# Patient Record
Sex: FEMALE | Race: WHITE | HISPANIC OR LATINO | Employment: FULL TIME | URBAN - METROPOLITAN AREA
[De-identification: names, ages, dates, MRNs, and addresses within clinical notes are randomized per-mention and may not be internally consistent; named-entity substitution may affect disease eponyms.]

---

## 2019-05-09 ENCOUNTER — OFFICE VISIT (OUTPATIENT)
Dept: URGENT CARE | Facility: MEDICAL CENTER | Age: 39
End: 2019-05-09
Payer: COMMERCIAL

## 2019-05-09 VITALS
DIASTOLIC BLOOD PRESSURE: 80 MMHG | OXYGEN SATURATION: 100 % | HEART RATE: 86 BPM | HEIGHT: 68 IN | SYSTOLIC BLOOD PRESSURE: 133 MMHG | TEMPERATURE: 98.4 F | RESPIRATION RATE: 18 BRPM | WEIGHT: 244.38 LBS | BODY MASS INDEX: 37.04 KG/M2

## 2019-05-09 DIAGNOSIS — J02.9 SORE THROAT: Primary | ICD-10-CM

## 2019-05-09 DIAGNOSIS — J06.9 UPPER RESPIRATORY TRACT INFECTION, UNSPECIFIED TYPE: ICD-10-CM

## 2019-05-09 LAB — S PYO AG THROAT QL: NEGATIVE

## 2019-05-09 PROCEDURE — 87430 STREP A AG IA: CPT | Performed by: PHYSICIAN ASSISTANT

## 2019-05-09 PROCEDURE — 99213 OFFICE O/P EST LOW 20 MIN: CPT | Performed by: PHYSICIAN ASSISTANT

## 2019-11-23 ENCOUNTER — APPOINTMENT (EMERGENCY)
Dept: CT IMAGING | Facility: HOSPITAL | Age: 39
End: 2019-11-23
Payer: COMMERCIAL

## 2019-11-23 ENCOUNTER — HOSPITAL ENCOUNTER (EMERGENCY)
Facility: HOSPITAL | Age: 39
Discharge: HOME | End: 2019-11-23
Attending: EMERGENCY MEDICINE
Payer: COMMERCIAL

## 2019-11-23 ENCOUNTER — APPOINTMENT (EMERGENCY)
Dept: RADIOLOGY | Facility: HOSPITAL | Age: 39
End: 2019-11-23
Payer: COMMERCIAL

## 2019-11-23 VITALS
TEMPERATURE: 98 F | HEART RATE: 109 BPM | SYSTOLIC BLOOD PRESSURE: 158 MMHG | RESPIRATION RATE: 16 BRPM | DIASTOLIC BLOOD PRESSURE: 72 MMHG | BODY MASS INDEX: 38.24 KG/M2 | HEIGHT: 67 IN | OXYGEN SATURATION: 99 % | WEIGHT: 243.61 LBS

## 2019-11-23 DIAGNOSIS — S09.90XA INJURY OF HEAD, INITIAL ENCOUNTER: ICD-10-CM

## 2019-11-23 DIAGNOSIS — S00.81XA ABRASION OF FACE, INITIAL ENCOUNTER: ICD-10-CM

## 2019-11-23 DIAGNOSIS — S20.219A CHEST WALL CONTUSION: ICD-10-CM

## 2019-11-23 DIAGNOSIS — S93.401A RIGHT ANKLE SPRAIN: Primary | ICD-10-CM

## 2019-11-23 PROCEDURE — 73610 X-RAY EXAM OF ANKLE: CPT

## 2019-11-23 PROCEDURE — 70450 CT HEAD/BRAIN W/O DYE: CPT

## 2019-11-23 PROCEDURE — 99284 EMERGENCY DEPT VISIT MOD MDM: CPT

## 2019-11-23 PROCEDURE — 99284 EMERGENCY DEPT VISIT MOD MDM: CPT | Performed by: EMERGENCY MEDICINE

## 2019-11-23 PROCEDURE — 71046 X-RAY EXAM CHEST 2 VIEWS: CPT
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
## 2019-11-23 NOTE — DISCHARGE INSTRUCTIONS
Wash your face in the shower  Right ankle, use the Aircast as needed  Use crutches as needed  Tylenol or Motrin for pain  Follow up with your doctor or return if worse

## 2020-06-04 ENCOUNTER — OFFICE VISIT (OUTPATIENT)
Dept: URGENT CARE | Facility: MEDICAL CENTER | Age: 40
End: 2020-06-04
Payer: COMMERCIAL

## 2020-06-04 VITALS
HEIGHT: 67 IN | BODY MASS INDEX: 41.06 KG/M2 | TEMPERATURE: 97.5 F | SYSTOLIC BLOOD PRESSURE: 116 MMHG | WEIGHT: 261.6 LBS | DIASTOLIC BLOOD PRESSURE: 66 MMHG | HEART RATE: 63 BPM | RESPIRATION RATE: 18 BRPM | OXYGEN SATURATION: 100 %

## 2020-06-04 DIAGNOSIS — L02.91 ABSCESS: Primary | ICD-10-CM

## 2020-06-04 PROCEDURE — G0382 LEV 3 HOSP TYPE B ED VISIT: HCPCS | Performed by: PHYSICIAN ASSISTANT

## 2020-06-04 PROCEDURE — 10060 I&D ABSCESS SIMPLE/SINGLE: CPT | Performed by: PHYSICIAN ASSISTANT

## 2020-06-04 RX ORDER — CETIRIZINE HYDROCHLORIDE 10 MG/1
TABLET ORAL
COMMUNITY

## 2020-06-04 RX ORDER — VALACYCLOVIR HYDROCHLORIDE 500 MG/1
TABLET, FILM COATED ORAL
COMMUNITY
Start: 2020-04-13

## 2020-06-04 RX ORDER — DOXYCYCLINE 100 MG/1
100 TABLET ORAL 2 TIMES DAILY
Qty: 20 TABLET | Refills: 0 | Status: SHIPPED | OUTPATIENT
Start: 2020-06-04 | End: 2020-06-14

## 2020-06-04 RX ORDER — LORATADINE 10 MG/1
10 TABLET ORAL
COMMUNITY

## 2020-06-04 RX ORDER — ALBUTEROL SULFATE 90 UG/1
1 AEROSOL, METERED RESPIRATORY (INHALATION) EVERY 6 HOURS PRN
COMMUNITY

## 2021-04-27 ENCOUNTER — IMMUNIZATIONS (OUTPATIENT)
Dept: FAMILY MEDICINE CLINIC | Facility: HOSPITAL | Age: 41
End: 2021-04-27

## 2021-04-27 DIAGNOSIS — Z23 ENCOUNTER FOR IMMUNIZATION: Primary | ICD-10-CM

## 2021-04-27 PROCEDURE — 91300 SARS-COV-2 / COVID-19 MRNA VACCINE (PFIZER-BIONTECH) 30 MCG: CPT

## 2021-04-27 PROCEDURE — 0001A SARS-COV-2 / COVID-19 MRNA VACCINE (PFIZER-BIONTECH) 30 MCG: CPT

## 2021-05-19 ENCOUNTER — IMMUNIZATIONS (OUTPATIENT)
Dept: FAMILY MEDICINE CLINIC | Facility: HOSPITAL | Age: 41
End: 2021-05-19

## 2021-05-19 DIAGNOSIS — Z23 ENCOUNTER FOR IMMUNIZATION: Primary | ICD-10-CM

## 2021-05-19 PROCEDURE — 91300 SARS-COV-2 / COVID-19 MRNA VACCINE (PFIZER-BIONTECH) 30 MCG: CPT

## 2021-05-19 PROCEDURE — 0002A SARS-COV-2 / COVID-19 MRNA VACCINE (PFIZER-BIONTECH) 30 MCG: CPT

## 2021-05-26 ENCOUNTER — ANNUAL EXAM (OUTPATIENT)
Dept: OBGYN CLINIC | Facility: CLINIC | Age: 41
End: 2021-05-26
Payer: COMMERCIAL

## 2021-05-26 VITALS
HEIGHT: 67 IN | BODY MASS INDEX: 44.61 KG/M2 | SYSTOLIC BLOOD PRESSURE: 118 MMHG | DIASTOLIC BLOOD PRESSURE: 72 MMHG | WEIGHT: 284.2 LBS

## 2021-05-26 DIAGNOSIS — Z11.51 SCREENING FOR HPV (HUMAN PAPILLOMAVIRUS): ICD-10-CM

## 2021-05-26 DIAGNOSIS — Z12.31 SCREENING MAMMOGRAM FOR HIGH-RISK PATIENT: ICD-10-CM

## 2021-05-26 DIAGNOSIS — N97.9 FEMALE INFERTILITY: ICD-10-CM

## 2021-05-26 DIAGNOSIS — Z01.419 WOMEN'S ANNUAL ROUTINE GYNECOLOGICAL EXAMINATION: Primary | ICD-10-CM

## 2021-05-26 DIAGNOSIS — Z12.4 SCREENING FOR MALIGNANT NEOPLASM OF THE CERVIX: ICD-10-CM

## 2021-05-26 PROCEDURE — 3008F BODY MASS INDEX DOCD: CPT | Performed by: OBSTETRICS & GYNECOLOGY

## 2021-05-26 PROCEDURE — 87624 HPV HI-RISK TYP POOLED RSLT: CPT | Performed by: OBSTETRICS & GYNECOLOGY

## 2021-05-26 PROCEDURE — 99386 PREV VISIT NEW AGE 40-64: CPT | Performed by: OBSTETRICS & GYNECOLOGY

## 2021-05-26 PROCEDURE — G0145 SCR C/V CYTO,THINLAYER,RESCR: HCPCS | Performed by: OBSTETRICS & GYNECOLOGY

## 2021-05-26 NOTE — PROGRESS NOTES
Subjective      Macio Baker is a 37 y o  female who presents for annual well woman exam  Periods are regular every 28-30 days, lasting 5 days  No intermenstrual bleeding, spotting, or discharge  Current contraception: none  History of abnormal Pap smear: no  Family history of uterine or ovarian cancer: no  Regular self breast exam: yes    Menstrual History:  OB History        0    Para   0    Term   0       0    AB   0    Living   0       SAB   0    TAB   0    Ectopic   0    Multiple   0    Live Births   0                  Patient's last menstrual period was 2021  The following portions of the patient's history were reviewed and updated as appropriate: allergies, current medications, past family history, past medical history, past social history, past surgical history and problem list     Review of Systems  Review of Systems   Constitutional: Negative for activity change, appetite change, chills, fatigue and fever  Respiratory: Negative for cough and shortness of breath  Cardiovascular: Negative for chest pain, palpitations and leg swelling  Gastrointestinal: Negative for abdominal pain, constipation, diarrhea, nausea and vomiting  Genitourinary: Negative for difficulty urinating, dysuria, flank pain, frequency, hematuria, urgency and vaginal discharge  Neurological: Negative for dizziness and headaches  Psychiatric/Behavioral: Negative for confusion            Objective      /72 (BP Location: Left arm, Patient Position: Sitting, Cuff Size: Standard)   Ht 5' 7" (1 702 m)   Wt 129 kg (284 lb 3 2 oz)   LMP 2021   BMI 44 51 kg/m²     Physical Exam  OBGyn Exam     General:   alert and oriented, in no acute distress, alert, appears stated age and cooperative   Heart: regular rate and rhythm, S1, S2 normal, no murmur, click, rub or gallop   Lungs: clear to auscultation bilaterally   Abdomen: soft, non-tender, without masses or organomegaly   Vulva: normal Vagina: normal mucosa, normal discharge   Cervix: no cervical motion tenderness, no lesions and nulliparous appearance   Uterus: normal size   Adnexa:  Breast Exam:  normal adnexa  breasts appear normal, no suspicious masses, no skin or nipple changes or axillary nodes  Assessment      @well woman@   64-year-old female  Annual exam   History of gastric sleeve   Genital herpes   Desire to be checked for infertility and ovarian reserve  Plan    Pap/HPV  Diet/ exercise  Calcium/ vitamin-D  Mammogram  [facility]   Pelvic ultrasound   [facility] referral for considering ovarian reserve  Return to office for annual exam   All questions answered  There are no Patient Instructions on file for this visit

## 2021-05-27 LAB
HPV HR 12 DNA CVX QL NAA+PROBE: POSITIVE
HPV16 DNA CVX QL NAA+PROBE: NEGATIVE
HPV18 DNA CVX QL NAA+PROBE: NEGATIVE

## 2021-05-28 ENCOUNTER — HOSPITAL ENCOUNTER (OUTPATIENT)
Dept: MAMMOGRAPHY | Facility: CLINIC | Age: 41
Discharge: HOME | End: 2021-05-28
Payer: COMMERCIAL

## 2021-05-28 ENCOUNTER — APPOINTMENT (OUTPATIENT)
Dept: LAB | Facility: HOSPITAL | Age: 41
End: 2021-05-28
Payer: COMMERCIAL

## 2021-05-28 VITALS — BODY MASS INDEX: 44.57 KG/M2 | HEIGHT: 67 IN | WEIGHT: 284 LBS

## 2021-05-28 DIAGNOSIS — Z12.31 SCREENING MAMMOGRAM FOR HIGH-RISK PATIENT: ICD-10-CM

## 2021-05-28 DIAGNOSIS — N97.9 FEMALE INFERTILITY: Primary | ICD-10-CM

## 2021-05-28 PROCEDURE — 77063 BREAST TOMOSYNTHESIS BI: CPT

## 2021-05-28 PROCEDURE — 83516 IMMUNOASSAY NONANTIBODY: CPT

## 2021-05-28 PROCEDURE — 77067 SCR MAMMO BI INCL CAD: CPT

## 2021-06-01 LAB
LAB AP GYN PRIMARY INTERPRETATION: NORMAL
LAB AP LMP: NORMAL
Lab: NORMAL

## 2021-06-04 LAB — MIS SERPL-MCNC: 2.33 NG/ML

## 2021-06-06 ENCOUNTER — HOSPITAL ENCOUNTER (OUTPATIENT)
Dept: ULTRASOUND IMAGING | Facility: HOSPITAL | Age: 41
Discharge: HOME | End: 2021-06-06
Payer: COMMERCIAL

## 2021-06-06 DIAGNOSIS — N97.9 FEMALE INFERTILITY: ICD-10-CM

## 2021-06-06 PROCEDURE — 76830 TRANSVAGINAL US NON-OB: CPT

## 2021-06-06 PROCEDURE — 76856 US EXAM PELVIC COMPLETE: CPT

## 2021-06-23 DIAGNOSIS — N84.1 POLYP AT CERVICAL OS: Primary | ICD-10-CM

## 2021-09-10 ENCOUNTER — OFFICE VISIT (OUTPATIENT)
Dept: URGENT CARE | Facility: MEDICAL CENTER | Age: 41
End: 2021-09-10
Payer: COMMERCIAL

## 2021-09-10 DIAGNOSIS — J02.9 ACUTE VIRAL PHARYNGITIS: Primary | ICD-10-CM

## 2021-09-10 LAB — S PYO AG THROAT QL: NEGATIVE

## 2021-09-10 PROCEDURE — G0382 LEV 3 HOSP TYPE B ED VISIT: HCPCS | Performed by: PHYSICIAN ASSISTANT

## 2021-09-10 PROCEDURE — 87147 CULTURE TYPE IMMUNOLOGIC: CPT | Performed by: PHYSICIAN ASSISTANT

## 2021-09-10 PROCEDURE — U0005 INFEC AGEN DETEC AMPLI PROBE: HCPCS | Performed by: PHYSICIAN ASSISTANT

## 2021-09-10 PROCEDURE — 87070 CULTURE OTHR SPECIMN AEROBIC: CPT | Performed by: PHYSICIAN ASSISTANT

## 2021-09-10 PROCEDURE — U0003 INFECTIOUS AGENT DETECTION BY NUCLEIC ACID (DNA OR RNA); SEVERE ACUTE RESPIRATORY SYNDROME CORONAVIRUS 2 (SARS-COV-2) (CORONAVIRUS DISEASE [COVID-19]), AMPLIFIED PROBE TECHNIQUE, MAKING USE OF HIGH THROUGHPUT TECHNOLOGIES AS DESCRIBED BY CMS-2020-01-R: HCPCS | Performed by: PHYSICIAN ASSISTANT

## 2021-09-10 NOTE — PATIENT INSTRUCTIONS
Pharyngitis  Salt water gargles  Follow up with PCP in 3-5 days  Proceed to  ER if symptoms worsen  [address]    Your healthcare provider and/or public health staff have evaluated you and have determined that you do not need to remain in the hospital at this time  At this time you can be isolated at home where you will be monitored by staff from your local or state health department  You should carefully follow the prevention and isolation steps below until a healthcare provider or local or state health department says that you can return to your normal activities  Stay home except to get medical care    People who are mildly ill with COVID-19 are able to isolate at home during their illness  You should restrict activities outside your home, except for getting medical care  Do not go to work, school, or public areas  Avoid using public transportation, ride-sharing, or taxis  Separate yourself from other people and animals in your home    People: As much as possible, you should stay in a specific room and away from other people in your home  Also, you should use a separate bathroom, if available  Animals: You should restrict contact with pets and other animals while you are sick with COVID-19, just like you would around other people  Although there have not been reports of pets or other animals becoming sick with COVID-19, it is still recommended that people sick with COVID-19 limit contact with animals until more information is known about the virus  When possible, have another member of your household care for your animals while you are sick  If you are sick with COVID-19, avoid contact with your pet, including petting, snuggling, being kissed or licked, and sharing food  If you must care for your pet or be around animals while you are sick, wash your hands before and after you interact with pets and wear a facemask  See COVID-19 and Animals for more information      Call ahead before visiting your doctor    If you have a medical appointment, call the healthcare provider and tell them that you have or may have COVID-19  This will help the healthcare providers office take steps to keep other people from getting infected or exposed  Wear a facemask    You should wear a facemask when you are around other people (e g , sharing a room or vehicle) or pets and before you enter a healthcare providers office  If you are not able to wear a facemask (for example, because it causes trouble breathing), then people who live with you should not stay in the same room with you, or they should wear a facemask if they enter your room  Cover your coughs and sneezes    Cover your mouth and nose with a tissue when you cough or sneeze  Throw used tissues in a lined trash can  Immediately wash your hands with soap and water for at least 20 seconds or, if soap and water are not available, clean your hands with an alcohol-based hand  that contains at least 60% alcohol  Clean your hands often    Wash your hands often with soap and water for at least 20 seconds, especially after blowing your nose, coughing, or sneezing; going to the bathroom; and before eating or preparing food  If soap and water are not readily available, use an alcohol-based hand  with at least 60% alcohol, covering all surfaces of your hands and rubbing them together until they feel dry  Soap and water are the best option if hands are visibly dirty  Avoid touching your eyes, nose, and mouth with unwashed hands  Avoid sharing personal household items    You should not share dishes, drinking glasses, cups, eating utensils, towels, or bedding with other people or pets in your home  After using these items, they should be washed thoroughly with soap and water      Clean all high-touch surfaces everyday    High touch surfaces include counters, tabletops, doorknobs, bathroom fixtures, toilets, phones, keyboards, tablets, and bedside tables  Also, clean any surfaces that may have blood, stool, or body fluids on them  Use a household cleaning spray or wipe, according to the label instructions  Labels contain instructions for safe and effective use of the cleaning product including precautions you should take when applying the product, such as wearing gloves and making sure you have good ventilation during use of the product  Monitor your symptoms    Seek prompt medical attention if your illness is worsening (e g , difficulty breathing)  Before seeking care, call your healthcare provider and tell them that you have, or are being evaluated for, COVID-19  Put on a facemask before you enter the facility  These steps will help the healthcare providers office to keep other people in the office or waiting room from getting infected or exposed  Ask your healthcare provider to call the local or [facility] health department  Persons who are placed under active monitoring or facilitated self-monitoring should follow instructions provided by their local health department or occupational health professionals, as appropriate  If you have a medical emergency and need to call 911, notify the dispatch personnel that you have, or are being evaluated for COVID-19  If possible, put on a facemask before emergency medical services arrive      Discontinuing home isolation    Patients with confirmed COVID-19 should remain under home isolation precautions until the following conditions are met:   - They have had no fever for at least 24 hours (that is one full day of no fever without the use medicine that reduces fevers)  AND  - other symptoms have improved (for example, when their cough or shortness of breath have improved)  AND  - If had mild or moderate illness, at least 10 days have passed since their symptoms first appeared or if severe illness (needed oxygen) or immunosuppressed, at least 20 days have passed since symptoms first appeared  Patients with confirmed COVID-19 should also notify close contacts (including their workplace) and ask that they self-quarantine  Currently, close contact is defined as being within 6 feet for 15 minutes or more from the period 24 hours starting 48 hours before symptom onset to the time at which the patient went into isolation  Close contacts of patients diagnosed with COVID-19 should be instructed by the patient to self-quarantine for 14 days from the last time of their last contact with the patient  Source: [employer]  Pharyngitis   WHAT YOU NEED TO KNOW:   Pharyngitis, or sore throat, is inflammation of the tissues and structures in your pharynx (throat)  Pharyngitis is most often caused by bacteria  It may also be caused by a cold or flu virus  Other causes include smoking, allergies, or acid reflux  DISCHARGE INSTRUCTIONS:   Call 911 for any of the following:   · You have trouble breathing or swallowing because your throat is swollen or sore  Return to the emergency department if:   · You are drooling because it hurts too much to swallow  · Your fever is higher than 102? F (39?C) or lasts longer than 3 days  · You are confused  · You taste blood in your throat  Contact your healthcare provider if:   · Your throat pain gets worse  · You have a painful lump in your throat that does not go away after 5 days  · Your symptoms do not improve after 5 days  · You have questions or concerns about your condition or care  Medicines:  Viral pharyngitis will go away on its own without treatment  Your sore throat should start to feel better in 3 to 5 days for both viral and bacterial infections  You may need any of the following:  · Antibiotics  treat a bacterial infection  · NSAIDs , such as ibuprofen, help decrease swelling, pain, and fever  NSAIDs can cause stomach bleeding or kidney problems in certain people   If you take blood thinner medicine, always ask your healthcare provider if NSAIDs are safe for you  Always read the medicine label and follow directions  · Acetaminophen  decreases pain and fever  It is available without a doctor's order  Ask how much to take and how often to take it  Follow directions  Acetaminophen can cause liver damage if not taken correctly  · Take your medicine as directed  Contact your healthcare provider if you think your medicine is not helping or if you have side effects  Tell him or her if you are allergic to any medicine  Keep a list of the medicines, vitamins, and herbs you take  Include the amounts, and when and why you take them  Bring the list or the pill bottles to follow-up visits  Carry your medicine list with you in case of an emergency  Manage your symptoms:   · Gargle salt water  Mix ¼ teaspoon salt in an 8 ounce glass of warm water and gargle  This may help decrease swelling in your throat  · Drink liquids as directed  You may need to drink more liquids than usual  Liquids may help soothe your throat and prevent dehydration  Ask how much liquid to drink each day and which liquids are best for you  · Use a cool-steam humidifier  to help moisten the air in your room and calm your cough  · Soothe your throat  with cough drops, ice, soft foods, or popsicles  Prevent the spread of pharyngitis:  Cover your mouth and nose when you cough or sneeze  Do not share food or drinks  Wash your hands often  Use soap and water  If soap and water are unavailable, use an alcohol based hand   Follow up with your healthcare provider as directed:  Write down your questions so you remember to ask them during your visits  © Copyright [employer] 2021 Information is for End User's use only and may not be sold, redistributed or otherwise used for commercial purposes   All illustrations and images included in CareNotes® are the copyrighted property of [employer]  or [facility] [facility]  The above information is an  only  It is not intended as medical advice for individual conditions or treatments  Talk to your doctor, nurse or pharmacist before following any medical regimen to see if it is safe and effective for you

## 2021-09-10 NOTE — PROGRESS NOTES
[address]        NAME: Kristin Rodriguez is a 37 y o  female  : 1980    MRN: [id number]  DATE: September 10, 2021  TIME: 11:10 AM    Assessment and Plan   Acute viral pharyngitis [J02 9]  1  Acute viral pharyngitis           Patient Instructions     Pharyngitis  Salt water gargles  Follow up with PCP in 3-5 days  Proceed to  ER if symptoms worsen  Chief Complaint     Chief Complaint   Patient presents with    Sore Throat     Started last week but last night was worse and started with nasal congestion  Has been taking cold/flu medication  Is covid vaccinated May was second shot  History of Present Illness       36 y/o female presents c/o sore throat, pain on swallowing and congestion x 3 days  Denies fever, chills, SOB      Review of Systems   Review of Systems   Constitutional: Negative for activity change, appetite change, chills, diaphoresis, fatigue and fever  HENT: Positive for congestion, rhinorrhea and sore throat  Negative for ear discharge, ear pain, facial swelling, hearing loss, mouth sores, nosebleeds, postnasal drip, sinus pressure, sinus pain, sneezing and voice change  Respiratory: Positive for cough  Negative for apnea, choking, chest tightness, shortness of breath, wheezing and stridor  Cardiovascular: Negative            Current Medications       Current Outpatient Medications:     cetirizine (ZyrTEC) 10 mg tablet, Take by mouth, Disp: , Rfl:     diclofenac sodium (VOLTAREN) 1 %, , Disp: , Rfl:     albuterol (PROVENTIL HFA,VENTOLIN HFA) 90 mcg/act inhaler, Inhale 1 puff every 6 (six) hours as needed (Patient not taking: Reported on 9/10/2021), Disp: , Rfl:     loratadine (CLARITIN) 10 mg tablet, Take 10 mg by mouth (Patient not taking: Reported on 9/10/2021), Disp: , Rfl:     valACYclovir (VALTREX) 500 mg tablet, , Disp: , Rfl:     Current Allergies     Allergies as of 09/10/2021 - Reviewed 09/10/2021   Allergen Reaction Noted    Other Itching 10/09/2015  Penicillins  05/09/2019            The following portions of the patient's history were reviewed and updated as appropriate: allergies, current medications, past family history, past medical history, past social history, past surgical history and problem list      Past Medical History:   Diagnosis Date    Allergic        Past Surgical History:   Procedure Laterality Date    BACK SURGERY      SLEEVE GASTROPLASTY      TONSILLECTOMY         Family History   Problem Relation Age of Onset    Hypertension Mother     Hypertension Father     Leukemia Father     No Known Problems Maternal Grandmother     No Known Problems Maternal Grandfather     No Known Problems Paternal Grandmother     Prostate cancer Paternal Grandfather     No Known Problems Brother     No Known Problems Maternal Aunt     No Known Problems Paternal Aunt     No Known Problems Paternal Aunt          Medications have been verified  Objective   There were no vitals taken for this visit  Physical Exam     Physical Exam  Constitutional:       General: She is not in acute distress  Appearance: She is well-developed  She is not diaphoretic  HENT:      Head: Normocephalic and atraumatic  Jaw: No trismus  Right Ear: Hearing, tympanic membrane, ear canal and external ear normal       Left Ear: Hearing, tympanic membrane, ear canal and external ear normal       Mouth/Throat:      Pharynx: Uvula midline  Posterior oropharyngeal erythema present  No oropharyngeal exudate or uvula swelling  Tonsils: No tonsillar abscesses  Cardiovascular:      Rate and Rhythm: Normal rate and regular rhythm  Heart sounds: Normal heart sounds  Pulmonary:      Effort: Pulmonary effort is normal       Breath sounds: Normal breath sounds  Musculoskeletal:      Cervical back: Normal range of motion and neck supple  Lymphadenopathy:      Cervical: Cervical adenopathy present

## 2021-09-11 LAB
BACTERIA THROAT CULT: ABNORMAL
SARS-COV-2 RNA RESP QL NAA+PROBE: NEGATIVE

## 2021-12-16 ENCOUNTER — NURSE TRIAGE (OUTPATIENT)
Dept: OTHER | Facility: OTHER | Age: 41
End: 2021-12-16

## 2021-12-16 DIAGNOSIS — Z20.828 SARS-ASSOCIATED CORONAVIRUS EXPOSURE: Primary | ICD-10-CM

## 2021-12-17 PROCEDURE — 87636 SARSCOV2 & INF A&B AMP PRB: CPT | Performed by: FAMILY MEDICINE

## 2022-06-08 ENCOUNTER — ANNUAL EXAM (OUTPATIENT)
Dept: OBGYN CLINIC | Facility: CLINIC | Age: 42
End: 2022-06-08
Payer: COMMERCIAL

## 2022-06-08 VITALS
DIASTOLIC BLOOD PRESSURE: 78 MMHG | BODY MASS INDEX: 41.68 KG/M2 | SYSTOLIC BLOOD PRESSURE: 112 MMHG | WEIGHT: 275 LBS | HEIGHT: 68 IN

## 2022-06-08 DIAGNOSIS — Z11.51 SCREENING FOR HUMAN PAPILLOMAVIRUS: ICD-10-CM

## 2022-06-08 DIAGNOSIS — Z12.31 SCREENING MAMMOGRAM, ENCOUNTER FOR: ICD-10-CM

## 2022-06-08 DIAGNOSIS — Z01.419 ROUTINE GYNECOLOGICAL EXAMINATION: Primary | ICD-10-CM

## 2022-06-08 DIAGNOSIS — Z12.4 SCREENING FOR MALIGNANT NEOPLASM OF THE CERVIX: ICD-10-CM

## 2022-06-08 DIAGNOSIS — R93.89 ABNORMAL ULTRASOUND OF PELVIS: ICD-10-CM

## 2022-06-08 PROCEDURE — 99396 PREV VISIT EST AGE 40-64: CPT | Performed by: OBSTETRICS & GYNECOLOGY

## 2022-06-08 PROCEDURE — G0476 HPV COMBO ASSAY CA SCREEN: HCPCS | Performed by: OBSTETRICS & GYNECOLOGY

## 2022-06-08 PROCEDURE — G0145 SCR C/V CYTO,THINLAYER,RESCR: HCPCS | Performed by: OBSTETRICS & GYNECOLOGY

## 2022-06-08 NOTE — PROGRESS NOTES
Hazle Paget is a 40 y o  female who presents for annual well woman exam  Periods are regular every 28-30 days, lasting 6 days  No intermenstrual bleeding, spotting, or discharge  Current contraception: abstinence  History of abnormal Pap smear: yes - hpv pos , pap neg   Family history of uterine or ovarian cancer: no    Family history of breast cancer: no    Menstrual History:  OB History        0    Para   0    Term   0       0    AB   0    Living   0       SAB   0    IAB   0    Ectopic   0    Multiple   0    Live Births   0                  Patient's last menstrual period was 2022  Period Cycle (Days): 28  Period Duration (Days): 5-7  Period Pattern: Regular  Menstrual Flow: Heavy, Moderate  Dysmenorrhea: (!) Mild  Dysmenorrhea Symptoms: Cramping    The following portions of the patient's history were reviewed and updated as appropriate: allergies, current medications, past family history, past medical history, past social history, past surgical history and problem list     Review of Systems  Review of Systems       Objective      /78 (BP Location: Right arm, Patient Position: Sitting, Cuff Size: Adult)   Ht 5' 7 5" (1 715 m)   Wt 125 kg (275 lb)   LMP 2022   BMI 42 44 kg/m²     Physical Exam  OBGyn Exam     General:   alert and oriented, in no acute distress, alert, appears stated age and cooperative   Heart: regular rate and rhythm, S1, S2 normal, no murmur, click, rub or gallop   Lungs: clear to auscultation bilaterally   Abdomen: soft, non-tender, without masses or organomegaly   Vulva: normal   Vagina: normal mucosa, normal discharge   Cervix: no cervical motion tenderness and no lesions   Uterus: normal size   Adnexa:  Breast Exam:  normal adnexa  breasts appear normal, no suspicious masses, no skin or nipple changes or axillary nodes  Assessment      @well woman@   Lalo Kendell     78-year-old female  Annual exam   History of gastric sleeve Genital herpes   Had  egg freeze   HPV positive/Pap-20 21  Plan    Pap/HPV  Diet/ exercise  Calcium/ vitamin-D  Mammogram  Return to office for annual exam     All questions answered  There are no Patient Instructions on file for this visit

## 2022-06-11 LAB
HPV HR 12 DNA CVX QL NAA+PROBE: NEGATIVE
HPV16 DNA CVX QL NAA+PROBE: NEGATIVE
HPV18 DNA CVX QL NAA+PROBE: NEGATIVE

## 2022-06-17 LAB
LAB AP GYN PRIMARY INTERPRETATION: NORMAL
Lab: NORMAL

## 2022-06-24 ENCOUNTER — HOSPITAL ENCOUNTER (OUTPATIENT)
Dept: ULTRASOUND IMAGING | Facility: HOSPITAL | Age: 42
Discharge: HOME | End: 2022-06-24
Payer: COMMERCIAL

## 2022-06-24 DIAGNOSIS — R93.89 ABNORMAL ULTRASOUND OF PELVIS: ICD-10-CM

## 2022-06-24 PROCEDURE — 76830 TRANSVAGINAL US NON-OB: CPT

## 2022-06-24 PROCEDURE — 76856 US EXAM PELVIC COMPLETE: CPT

## 2022-08-15 ENCOUNTER — HOSPITAL ENCOUNTER (OUTPATIENT)
Dept: MAMMOGRAPHY | Facility: CLINIC | Age: 42
Discharge: HOME | End: 2022-08-15
Payer: COMMERCIAL

## 2022-08-15 VITALS — WEIGHT: 275 LBS | HEIGHT: 68 IN | BODY MASS INDEX: 41.68 KG/M2

## 2022-08-15 DIAGNOSIS — Z12.31 SCREENING MAMMOGRAM, ENCOUNTER FOR: ICD-10-CM

## 2022-08-15 PROCEDURE — 77067 SCR MAMMO BI INCL CAD: CPT

## 2022-08-15 PROCEDURE — 77063 BREAST TOMOSYNTHESIS BI: CPT

## 2023-04-04 ENCOUNTER — OFFICE VISIT (OUTPATIENT)
Dept: DERMATOLOGY | Facility: CLINIC | Age: 43
End: 2023-04-04

## 2023-04-04 VITALS — WEIGHT: 272 LBS | BODY MASS INDEX: 41.22 KG/M2 | HEIGHT: 68 IN

## 2023-04-04 DIAGNOSIS — L72.9 FOLLICULAR CYST OF SKIN: Primary | ICD-10-CM

## 2023-04-04 DIAGNOSIS — Z13.89 SCREENING FOR SKIN CONDITION: ICD-10-CM

## 2023-04-04 DIAGNOSIS — D22.9 NEVUS: ICD-10-CM

## 2023-04-04 NOTE — PROGRESS NOTES
[address]  [address] [facility] [address]  [phone]  [phone]     MRN: [id number] : 1980  Encounter: [id number]  Patient Information: Elise Fernandez  Chief complaint: Cyst on abdomen and overall checkup    History of present illness: 42-year-old female presents because of concerns regarding a cyst on the right abdomen that been present for couple years has been drained twice previously and recently has become more inflamed at this time  Past Medical History:   Diagnosis Date   • Allergic      Past Surgical History:   Procedure Laterality Date   • BACK SURGERY     • SLEEVE GASTROPLASTY     • TONSILLECTOMY       Social History   Social History     Substance and Sexual Activity   Alcohol Use Yes     Social History     Substance and Sexual Activity   Drug Use Never     Social History     Tobacco Use   Smoking Status Never   Smokeless Tobacco Never     Family History   Problem Relation Age of Onset   • Hypertension Mother    • Hypertension Father    • Leukemia Father    • No Known Problems Maternal Grandmother    • No Known Problems Maternal Grandfather    • No Known Problems Paternal Grandmother    • Prostate cancer Paternal Grandfather    • No Known Problems Brother    • No Known Problems Maternal Aunt    • No Known Problems Paternal Aunt    • No Known Problems Paternal Aunt    • Breast cancer Neg Hx      Meds/Allergies   Allergies   Allergen Reactions   • Other Itching   • Penicillins        Meds:  Prior to Admission medications    Medication Sig Start Date End Date Taking?  Authorizing Provider   albuterol (PROVENTIL HFA,VENTOLIN HFA) 90 mcg/act inhaler Inhale 1 puff every 6 (six) hours as needed   Yes Historical Provider, MD   cetirizine (ZyrTEC) 10 mg tablet Take by mouth   Yes Historical Provider, MD   diclofenac sodium (VOLTAREN) 1 %  20  Yes Historical Provider, MD   loratadine (CLARITIN) 10 mg tablet Take 10 mg by mouth   Yes "Historical Provider, MD   valACYclovir (VALTREX) 500 mg tablet  4/13/20  Yes Historical Provider, MD       Subjective:     Review of Systems:    General: negative for - chills, fatigue, fever,  weight gain or weight loss  Psychological: negative for - anxiety, behavioral disorder, concentration difficulties, decreased libido, depression, irritability, memory difficulties, mood swings, sleep disturbances or suicidal ideation  ENT: negative for - hearing difficulties , nasal congestion, nasal discharge, oral lesions, sinus pain, sneezing, sore throat  Allergy and Immunology: negative for - hives, insect bite sensitivity,  Hematological and Lymphatic: negative for - bleeding problems, blood clots,bruising, swollen lymph nodes  Endocrine: negative for - hair pattern changes, hot flashes, malaise/lethargy, mood swings, palpitations, polydipsia/polyuria, skin changes, temperature intolerance or unexpected weight change  Respiratory: negative for - cough, hemoptysis, orthopnea, shortness of breath, or wheezing  Cardiovascular: negative for - chest pain, dyspnea on exertion, edema,  Gastrointestinal: negative for - abdominal pain, nausea/vomiting  Genito-Urinary: negative for - dysuria, incontinence, irregular/heavy menses or urinary frequency/urgency  Musculoskeletal: negative for - gait disturbance, joint pain, joint stiffness, joint swelling, muscle pain, muscular weakness  Dermatological:  As in HPI  Neurological: negative for confusion, dizziness, headaches, impaired coordination/balance, memory loss, numbness/tingling, seizures, speech problems, tremors or weakness       Objective:   Ht 5' 7 5\" (1 715 m)   Wt 123 kg (272 lb)   BMI 41 97 kg/m²     Physical Exam:    General Appearance:    Alert, cooperative, no distress   Head:    Normocephalic, without obvious abnormality, atraumatic           Skin:   A full skin exam was performed including scalp, head scalp, eyes, ears, nose, lips, neck, chest, axilla, abdomen, " "back, buttocks, bilateral upper extremities, bilateral lower extremities, hands, feet, fingers, toes, fingernails, and toenails cystic 4 cm area that appears deep subcutaneous area right abdomen normal pigmented lesions regular shape and color nothing markedly atypical noted on exam  Intralesional Injection Procedure Note  Diagnosis: Inflamed cyst  Location: Right abdomen informed consent:  Discussed risks (infection, pain, bleeding, bruising, thinning of the skin, loss of skin pigment, lack of resolution, and recurrence of lesion) and benefits of the procedure, as well as the alternatives   Informed consent was obtained  Preparation: The area was prepared a standard fashion  Anesthesia: not required  Procedure Details: An intralesional injection was performed with                             0 5 cc of Kenalog 10 diluted one-to-one with 0 9 normal saline in total were injected  Total number of lesions injected:  1       Assessment:     1  Follicular cyst of skin        2  Nevus        3  Screening for skin condition              Plan: Follicular cyst advised patient that this is from hair follicles that ballooned out and forms this type of growth  No treatment indicated unless patient so desires or if lesion enlarges or changes  Hopefully the Kenalog injection will decrease inflammation and strength lesion down before removal  Nevi reviewed the concept of ABCDE and ugly duckling nothing markedly atypical patient reassured  Screening for Dermatologic Disorders: Nothing else of concern noted on complete exam follow up in 1 year     Janette Morel MD  4/4/2023,8:26 AM    Portions of the record may have been created with voice recognition software   Occasional wrong word or \"sound a like\" substitutions may have occurred due to the inherent limitations of voice recognition software   Read the chart carefully and recognize, using context, where substitutions have occurred    "

## 2023-04-04 NOTE — PATIENT INSTRUCTIONS
Follicular cyst advised patient that this is from hair follicles that ballooned out and forms this type of growth  No treatment indicated unless patient so desires or if lesion enlarges or changes    Nevi reviewed the concept of ABCDE and syed hutchinson nothing markedly atypical patient reassured  Screening for Dermatologic Disorders: Nothing else of concern noted on complete exam follow up in 1 year

## 2023-04-04 NOTE — PROGRESS NOTES
"[facility] Note     Patient Name: Harriet Sheppard  Encounter Date: April 4, 2023     Have you been [place] a [place] [address] [place] in the last 3 years and, if so, which description applies to you? NO  I am considered a \"new\" patient and must complete all patient intake questions  I am FEMALE/of child-bearing potential     REVIEW OF SYSTEMS:  Have you recently had or currently have any of the following? · Recent fever or chills? No  · Any non-healing wound? No  · Are you pregnant or planning to become pregnant? No  · Are you currently or planning to be nursing or breast feeding? No   PAST MEDICAL HISTORY:  Have you personally ever had or currently have any of the following? If \"YES,\" then please provide more detail  · Skin cancer (such as Melanoma, Basal Cell Carcinoma, Squamous Cell Carcinoma? No  · Tuberculosis, HIV/AIDS, Hepatitis B or C: No  · Systemic Immunosuppression such as Diabetes, Biologic or Immunotherapy, Chemotherapy, Organ Transplantation, Bone Marrow Transplantation No  · Radiation Treatment No   FAMILY HISTORY:  Any \"first degree relatives\" (parent, brother, sister, or child) with the following? • Skin Cancer, Pancreatic or Other Cancer? YES, Dad- Leukemia   PATIENT EXPERIENCE:    • Do you want the Dermatologist to perform a COMPLETE skin exam today including a clinical examination under the \"bra and underwear\" areas? Yes  • If necessary, do we have your permission to call and leave a detailed message on your Preferred Phone number that includes your specific medical information?   Yes      Allergies   Allergen Reactions   • Other Itching   • Penicillins       Current Outpatient Medications:   •  albuterol (PROVENTIL HFA,VENTOLIN HFA) 90 mcg/act inhaler, Inhale 1 puff every 6 (six) hours as needed, Disp: , Rfl:   •  cetirizine (ZyrTEC) 10 mg tablet, Take by mouth, Disp: , Rfl:   •  diclofenac sodium (VOLTAREN) 1 %, , Disp: , Rfl:   •  loratadine (CLARITIN) 10 mg " tablet, Take 10 mg by mouth, Disp: , Rfl:   •  valACYclovir (VALTREX) 500 mg tablet, , Disp: , Rfl:           • Whom besides the patient is providing clinical information about today's encounter?   o NO ADDITIONAL HISTORIAN (patient alone provided history)    Physical Exam and Assessment/Plan by Diagnosis:

## 2023-04-26 ENCOUNTER — OFFICE VISIT (OUTPATIENT)
Age: 43
End: 2023-04-26

## 2023-04-26 VITALS — BODY MASS INDEX: 40.62 KG/M2 | WEIGHT: 268 LBS | HEIGHT: 68 IN

## 2023-04-26 DIAGNOSIS — L72.9 FOLLICULAR CYST OF SKIN: Primary | ICD-10-CM

## 2023-04-26 NOTE — PROGRESS NOTES
"[facility] [address] [place] [address]  [phone]     MRN: [id number] : 1980  Encounter: [id number]  Patient Information: Jsoe Jim    Subjective:     81-year-old female presents for follow-up secondary to the inflamed follicular cyst right lower abdomen     Objective:   Ht 5' 7 5\" (1 715 m)   Wt 122 kg (268 lb)   BMI 41 36 kg/m²     Physical Exam:    General Appearance:    Alert, cooperative, no distress   Skin:  Site excision healing well  Procedure:  Suture removal  Site of excision: Right abdomen  Wound was cleansed with saline  Wound is intact  Sutures removed  Steri-Strips applied  Biopsy revealed cyst with quite a bit of inflammation     Assessment:     1  Follicular cyst of skin              Plan:   Wound care instructions given to patient        Prior to Admission medications    Medication Sig Start Date End Date Taking?  Authorizing Provider   albuterol (PROVENTIL HFA,VENTOLIN HFA) 90 mcg/act inhaler Inhale 1 puff every 6 (six) hours as needed    Historical Provider, MD   cetirizine (ZyrTEC) 10 mg tablet Take by mouth    Historical Provider, MD   diclofenac sodium (VOLTAREN) 1 %  20   Historical Provider, MD   doxycycline hyclate (VIBRAMYCIN) 100 mg capsule Take 1 capsule (100 mg total) by mouth every 12 (twelve) hours for 14 days 23  Daniel Hancock MD   loratadine (CLARITIN) 10 mg tablet Take 10 mg by mouth    Historical Provider, MD   valACYclovir (VALTREX) 500 mg tablet  20   Historical Provider, MD     Allergies   Allergen Reactions   • Other Itching   • Penicillins        Daniel Hancock MD  2023,8:05 AM    Portions of the record may have been created with voice recognition software   Occasional wrong word or \"sound a like\" substitutions may have occurred due to the inherent limitations of voice recognition software   Read the chart carefully and recognize, using context, where substitutions have " occurred

## 2023-04-26 NOTE — PATIENT INSTRUCTIONS
STERI-STRIPS (BUTTERFLY) POST SURGERY        Steri-Strips have been placed over your incision site to give added support  Please continue to bathe the area as usual     Eventually the Steri-Strips will begin to peel off on the ends  Snip the raised ends off with scissors and let the rest fall off on their own  If you have any questions, please call our office   [phone]

## 2023-06-02 ENCOUNTER — OFFICE VISIT (OUTPATIENT)
Dept: OBGYN CLINIC | Facility: CLINIC | Age: 43
End: 2023-06-02

## 2023-06-02 VITALS
HEIGHT: 68 IN | DIASTOLIC BLOOD PRESSURE: 80 MMHG | SYSTOLIC BLOOD PRESSURE: 128 MMHG | BODY MASS INDEX: 40.92 KG/M2 | WEIGHT: 270 LBS

## 2023-06-02 DIAGNOSIS — Z12.31 SCREENING MAMMOGRAM, ENCOUNTER FOR: ICD-10-CM

## 2023-06-02 DIAGNOSIS — Z01.419 WOMEN'S ANNUAL ROUTINE GYNECOLOGICAL EXAMINATION: Primary | ICD-10-CM

## 2023-06-02 DIAGNOSIS — Z11.3 ROUTINE SCREENING FOR STI (SEXUALLY TRANSMITTED INFECTION): ICD-10-CM

## 2023-06-02 DIAGNOSIS — B00.9 HERPES: ICD-10-CM

## 2023-06-02 PROCEDURE — 87661 TRICHOMONAS VAGINALIS AMPLIF: CPT | Performed by: OBSTETRICS & GYNECOLOGY

## 2023-06-02 PROCEDURE — 87591 N.GONORRHOEAE DNA AMP PROB: CPT | Performed by: OBSTETRICS & GYNECOLOGY

## 2023-06-02 PROCEDURE — 87563 M. GENITALIUM AMP PROBE: CPT | Performed by: OBSTETRICS & GYNECOLOGY

## 2023-06-02 PROCEDURE — 87491 CHLMYD TRACH DNA AMP PROBE: CPT | Performed by: OBSTETRICS & GYNECOLOGY

## 2023-06-02 RX ORDER — VALACYCLOVIR HYDROCHLORIDE 500 MG/1
500 TABLET, FILM COATED ORAL 2 TIMES DAILY
Qty: 6 TABLET | Refills: 1 | Status: SHIPPED | OUTPATIENT
Start: 2023-06-02 | End: 2023-06-05

## 2023-06-02 NOTE — PROGRESS NOTES
"Snow Keating is a 44 y o  female who presents for annual well woman exam  Periods are regular every 28-30 days, lasting 5 days  No intermenstrual bleeding, spotting, or discharge  Current contraception: condoms  History of abnormal Pap smear: yes -   Family history of uterine or ovarian cancer: no    Family history of breast cancer: no    Menstrual History:  OB History        0    Para   0    Term   0       0    AB   0    Living   0       SAB   0    IAB   0    Ectopic   0    Multiple   0    Live Births   0               Patient's last menstrual period was 2023  Period Cycle (Days): 28  Period Duration (Days): 5  Period Pattern: Regular  Menstrual Flow: Heavy (1-3 days heavy)  Dysmenorrhea: (!) Mild  Dysmenorrhea Symptoms: Cramping    The following portions of the patient's history were reviewed and updated as appropriate: allergies, current medications, past family history, past medical history, past social history, past surgical history and problem list     Review of Systems  Review of Systems       Objective      /80 (BP Location: Left arm, Patient Position: Sitting, Cuff Size: Adult)   Ht 5' 7 5\" (1 715 m)   Wt 122 kg (270 lb)   LMP 2023   BMI 41 66 kg/m²     Physical Exam  OBGyn Exam     General:   alert and oriented, in no acute distress, alert, appears stated age and cooperative   Heart: regular rate and rhythm, S1, S2 normal, no murmur, click, rub or gallop   Lungs: clear to auscultation bilaterally   Abdomen: soft, non-tender, without masses or organomegaly   Vulva: normal   Vagina: normal mucosa, normal discharge   Cervix: no cervical motion tenderness and no lesions   Uterus: normal size   Adnexa:  Breast Exam:  normal adnexa  breasts appear normal, no suspicious masses, no skin or nipple changes or axillary nodes                  Assessment      @well woman@      44year-old female  Annual exam   History of gastric sleeve   Genital herpes   Had  egg " freeze   HPV positive/Pap-20 21  Plan   Desires STD check    Pap/HPV neg 2022  Diet/ exercise  Calcium/ vitamin-D  Mammogram  Return to office for annual exam          All questions answered  There are no Patient Instructions on file for this visit

## 2023-06-05 LAB
C TRACH DNA SPEC QL NAA+PROBE: NEGATIVE
M GENITALIUM DNA SPEC QL NAA+PROBE: NEGATIVE
N GONORRHOEA DNA SPEC QL NAA+PROBE: NEGATIVE
T VAGINALIS DNA SPEC QL NAA+PROBE: NEGATIVE

## 2023-10-05 ENCOUNTER — TELEPHONE (OUTPATIENT)
Dept: OBGYN CLINIC | Facility: CLINIC | Age: 43
End: 2023-10-05

## 2023-10-05 NOTE — TELEPHONE ENCOUNTER
I called the patient and left a message asking her to call back regarding her appointment on 10/9/23 with Dr. Raya Ortiz. This was schedule via My Chart but there is no reason why she is coming in.

## 2023-10-09 ENCOUNTER — APPOINTMENT (OUTPATIENT)
Dept: RADIOLOGY | Facility: CLINIC | Age: 43
End: 2023-10-09
Payer: COMMERCIAL

## 2023-10-09 ENCOUNTER — OFFICE VISIT (OUTPATIENT)
Dept: OBGYN CLINIC | Facility: CLINIC | Age: 43
End: 2023-10-09
Payer: COMMERCIAL

## 2023-10-09 VITALS
DIASTOLIC BLOOD PRESSURE: 77 MMHG | BODY MASS INDEX: 39.4 KG/M2 | HEIGHT: 68 IN | SYSTOLIC BLOOD PRESSURE: 115 MMHG | WEIGHT: 260 LBS | HEART RATE: 86 BPM

## 2023-10-09 DIAGNOSIS — M17.11 PRIMARY OSTEOARTHRITIS OF RIGHT KNEE: ICD-10-CM

## 2023-10-09 DIAGNOSIS — M25.561 RIGHT KNEE PAIN, UNSPECIFIED CHRONICITY: ICD-10-CM

## 2023-10-09 DIAGNOSIS — M25.561 CHRONIC PAIN OF RIGHT KNEE: Primary | ICD-10-CM

## 2023-10-09 DIAGNOSIS — G89.29 CHRONIC PAIN OF RIGHT KNEE: Primary | ICD-10-CM

## 2023-10-09 PROCEDURE — 73564 X-RAY EXAM KNEE 4 OR MORE: CPT

## 2023-10-09 PROCEDURE — 73560 X-RAY EXAM OF KNEE 1 OR 2: CPT

## 2023-10-09 PROCEDURE — 99203 OFFICE O/P NEW LOW 30 MIN: CPT | Performed by: ORTHOPAEDIC SURGERY

## 2023-10-09 NOTE — PROGRESS NOTES
Patient Name:  Lilia Magana  MRN:  [id number]    [id number] [facility]     1. Chronic pain of right knee  -     XR knee 4+ vw right injury; Future; Expected date: 10/09/2023  -     XR knee 1 or 2 vw left; Future; Expected date: 10/09/2023    2. Primary osteoarthritis of right knee  -     Ambulatory Referral to Physical Therapy; Future        Right knee mild osteoarthritis   · X-rays reviewed in office today with patient  · In depth conversation had with patient regarding nonoperative treatments including OTC oral and topical analgesics, CSI, outpatient PT for strengthening and maintaining range of motion. · At this time, patient wished to move forward with outpatient PT and OTC medications. Declined CSI at this time  · If patient's pain persists, can consider CSI in the future  · Follow up in 3-4 months for reevaluation     Chief Complaint     Right knee pain    History of the Present Illness     Lilia Magana is a 44 y.o. female with Right knee pain ongoing for a couple months without injury. 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 Patient has pain with prolonged sitting and then attempting to stand up. She locates pain to the anteromedial aspect of the knee. She admits to a giving away sensation, as well. She does not take medication for pain relief. She denies surgical history, denies locking or swelling. Review of Systems     Review of Systems   Constitutional: Negative for chills and fever. HENT: Negative for congestion. Respiratory: Negative for cough, chest tightness and shortness of breath. Cardiovascular: Negative for chest pain and palpitations. Gastrointestinal: Negative for abdominal pain. Endocrine: Negative for cold intolerance and heat intolerance. Neurological: Negative for syncope. Psychiatric/Behavioral: Negative for confusion.        Physical Exam     /77   Pulse 86   Ht 5' 7.5" (1.715 m)   Wt 118 kg (260 lb)   BMI 40.12 kg/m²     Right Knee  Range of motion from 0 to 120 degrees without pain. There is no crepitus with range of motion. There is no effusion. There is tenderness over the anteromedial joint line. There is 5/5 quadriceps strength and preserved tone. The patient is able to perform a straight leg raise. negative patellar grind test.  Anterior drawer tests is negative  negative Lachman Test.   Posterior drawer test is   negative   Varus stress testing reveals no pain or instability at 0 and 30 degrees   Valgus stress testing reveals no pain or instability at 0 and 30 degrees  Ellen's testing is negative   The patient is neurovascular intact distally. Eyes:  Anicteric sclerae. Neck:  Supple. Lungs:  Normal respiratory effort. Cardiovascular:  Capillary refill is less than 2 seconds. Skin:  Intact without erythema. Neurologic:  Sensation grossly intact to light touch. Psychiatric:  Mood and affect are appropriate. Data Review     I have personally reviewed pertinent films in PACS, and my interpretation follows:    X-rays taken 10/09/2023 of Right knee independently reviewed and demonstrate mild medial joint compartment narrowing. No acute fracture or dislocation noted.      Past Medical History:   Diagnosis Date   • Allergic        Past Surgical History:   Procedure Laterality Date   • BACK SURGERY     • SLEEVE GASTROPLASTY     • TONSILLECTOMY         Allergies   Allergen Reactions   • Penicillins Itching       Current Outpatient Medications on File Prior to Visit   Medication Sig Dispense Refill   • albuterol (PROVENTIL HFA,VENTOLIN HFA) 90 mcg/act inhaler Inhale 1 puff every 6 (six) hours as needed     • cetirizine (ZyrTEC) 10 mg tablet Take by mouth     • loratadine (CLARITIN) 10 mg tablet Take 10 mg by mouth     • valACYclovir (VALTREX) 500 mg tablet Take 1 tablet (500 mg total) by mouth 2 (two) times a day for 3 days 6 tablet 1     No current facility-administered medications on file prior to visit. Social History     Tobacco Use   • Smoking status: Never   • Smokeless tobacco: Never   Vaping Use   • Vaping Use: Never used   Substance Use Topics   • Alcohol use:  Yes     Alcohol/week: 3.0 standard drinks of alcohol     Types: 3 Glasses of wine per week   • Drug use: Never       Family History   Problem Relation Age of Onset   • Hypertension Mother    • Hypertension Father    • Leukemia Father    • No Known Problems Maternal Grandmother    • No Known Problems Maternal Grandfather    • No Known Problems Paternal Grandmother    • Prostate cancer Paternal Grandfather    • No Known Problems Brother    • No Known Problems Maternal Aunt    • No Known Problems Paternal Aunt    • No Known Problems Paternal Aunt    • Breast cancer Neg Hx              Procedures Performed     Procedures  None       Luis Alfredo Carmen DO

## 2023-12-04 ENCOUNTER — HOSPITAL ENCOUNTER (OUTPATIENT)
Age: 43
Discharge: HOME | End: 2023-12-04
Payer: COMMERCIAL

## 2023-12-04 DIAGNOSIS — Z12.31 SCREENING MAMMOGRAM, ENCOUNTER FOR: ICD-10-CM

## 2023-12-04 PROCEDURE — 77067 SCR MAMMO BI INCL CAD: CPT

## 2023-12-04 PROCEDURE — 77063 BREAST TOMOSYNTHESIS BI: CPT

## 2023-12-17 ENCOUNTER — OFFICE VISIT (OUTPATIENT)
Age: 43
End: 2023-12-17
Payer: COMMERCIAL

## 2023-12-17 VITALS
TEMPERATURE: 99.2 F | DIASTOLIC BLOOD PRESSURE: 80 MMHG | BODY MASS INDEX: 41.2 KG/M2 | SYSTOLIC BLOOD PRESSURE: 135 MMHG | WEIGHT: 267 LBS | OXYGEN SATURATION: 96 % | HEART RATE: 88 BPM | RESPIRATION RATE: 18 BRPM

## 2023-12-17 DIAGNOSIS — U07.1 LAB TEST POSITIVE FOR DETECTION OF COVID-19 VIRUS: Primary | ICD-10-CM

## 2023-12-17 DIAGNOSIS — J02.9 SORE THROAT: ICD-10-CM

## 2023-12-17 LAB
S PYO AG THROAT QL: NEGATIVE
SARS-COV-2 AG UPPER RESP QL IA: POSITIVE
VALID CONTROL: ABNORMAL

## 2023-12-17 PROCEDURE — G0382 LEV 3 HOSP TYPE B ED VISIT: HCPCS | Performed by: PHYSICIAN ASSISTANT

## 2023-12-17 PROCEDURE — 87811 SARS-COV-2 COVID19 W/OPTIC: CPT

## 2023-12-17 PROCEDURE — 87880 STREP A ASSAY W/OPTIC: CPT

## 2023-12-17 RX ORDER — FLUCONAZOLE 150 MG/1
TABLET ORAL
COMMUNITY
Start: 2023-10-13

## 2023-12-17 NOTE — PATIENT INSTRUCTIONS
COVID-19 and Chronic Health Conditions   WHAT YOU NEED TO KNOW:   Your chronic condition may increase your risk for COVID-19 or serious problems it can cause. Healthcare providers might need to make changes that affect how you usually manage your chronic health condition. Providers may change hours of operation or not have patients come in to be seen. You may not be able to make appointments to get blood drawn or to have tests or procedures. This may continue until the virus that causes COVID-19 is controlled. Until then, you can take steps to manage your condition. The steps will also lower your risk for COVID-19 or the serious problems it causes. If you do develop COVID-19, healthcare providers will tell you when it is okay to be around others after you recover. This depends on your chronic condition, any symptoms of COVID-19 that developed, and how severe the symptoms were.  DISCHARGE INSTRUCTIONS:   Call your local emergency number (911 in the ) or an emergency department if:   You have trouble breathing or shortness of breath.    You have chest pain or pressure that lasts longer than 5 minutes.    You become confused or hard to wake.    Return to the emergency department if:   The skin on your face, fingers, or toes look blue or darker than usual.      Call your doctor or specialist if:   You have a fever.    You have symptoms of COVID-19.    You have questions or concerns about COVID-19 or your chronic condition.    What you need to know about serious problems from the virus:  You may develop long-term health problems caused by the virus. Your risk is higher if you are 65 or older. A weak immune system, obesity, diabetes, chronic kidney disease, or a heart or lung condition can also increase your risk. Your risk is also higher if you are a current or former cigarette smoker. COVID-19 can lead to any of the following:  Multisymptom inflammatory syndrome in adults (MIS-A) or in children (MIS-C), causing  inflammation in the heart, digestive system, skin, or brain    Shortness of breath, serious lower respiratory conditions, such as pneumonia or acute respiratory distress syndrome (ARDS)    Blood clots or blood vessel damage    Organ damage from a lack of oxygen or from blood clots    Sleep problems    Problems thinking clearly, remembering information, or concentrating    Mood changes, depression, or anxiety    Long-term problems tasting or smelling    Loss of appetite and weight loss    Nerve pain    Fatigue (feeling mentally and physically tired)    How the 2019 coronavirus spreads:  Close personal contact with an infected person increases your risk for infection. This means being within 6 feet (2 meters) of the person for at least 15 minutes over 24 hours.  The virus travels in droplets that form when a person talks, sings, coughs, or sneezes.  The droplets can also float in the air for minutes or hours. Infection happens when you breathe in the droplets or get them in your eyes or nose.    Person-to-person contact can spread the virus.  For example, a person with the virus on his or her hands can spread it by shaking hands with someone.    The virus can stay on objects and surfaces for hours to days.  You may become infected by touching the object or surface and then touching your eyes or mouth.    What you need to know about the signs and symptoms of COVID-19:  Signs and symptoms usually start about 5 days after infection but can take 2 to 14 days. Signs and symptoms range from mild to severe. You may feel like you have the flu or a bad cold. Your chronic health condition may cause some of the same symptoms COVID-19 causes. This can make it hard to know if a symptom is from COVID-19 or your chronic condition. Keep a record of any new or worsening symptom you have. This is especially important if you have a condition that often causes shortness of breath. Your provider can tell you if you should be tested for  COVID-19. Tell your healthcare provider if you think you were infected but develop signs or symptoms not listed below:  A cough    Shortness of breath or trouble breathing that may become severe    A fever of at least 100.4°F, or 38°C (may be lower in adults 65 or older)    Chills that might include shaking    Muscle pain, body aches, or a headache    A sore throat    Suddenly not being able to taste or smell anything    Feeling very tired (fatigue)    Congestion (stuffy head and nose), or a runny nose    Diarrhea, nausea, or vomiting    Manage your chronic health condition:  If you do not have a regular healthcare provider, experts recommend you contact a local [facility] health center or health department. The following can help you manage your condition and prevent COVID-19 during an active outbreak in your area:  Get emergency care for your condition if needed.  Talk to your healthcare providers about symptoms of your chronic condition that need immediate care. Your providers can help you create a plan or add exacerbation management to your plan. The plan will include when to go to an emergency department and when to call your local emergency number. This will depend on where you live and the services that are available.    Go to dialysis appointments as scheduled.  It is important to stay on schedule. You will need to have enough food to be able to follow the emergency diet plan if you must miss a session. The emergency diet needs to be part of the management plan for your condition.    Ask your healthcare provider for other ways to have appointments.  Some providers offer phone, video, or other types of appointments.    Follow any regular management plan you use.  Your healthcare provider will tell you if you need to make any changes to your regular management plan. For example, if you have asthma, continue to follow your asthma action plan. If you have diabetes, you may need to check your blood sugar level  more often. Stress and illness can make blood sugar levels go up. You may need to adjust medicine such as insulin. If you have a heart condition or high blood pressure, you may need to check your blood pressure more often. Stress and illness can also raise your blood pressure.    Talk to your healthcare providers about your medicines.  You may be able to get more than 1 month of medicine at a time. This will lower the number of times you need to go to a pharmacy to get your medicines. Make sure you have enough medicine if you have a condition that can lead to an emergency. Examples include asthma medicines, insulin, or an epinephrine pen. Check the expiration dates on the medicines you currently have. Ask for refills as soon as possible, if needed. If it is not time to refill prescriptions, you may be able to get an emergency supply of some medicines. Medicine plans vary, so ask your healthcare provider or pharmacist for options.    Have supplies available in your home or delivered as needed.  If possible, get extra supplies you use regularly. Examples include absorbent pads, syringes, and wound cleaning solutions. This will limit the number of trips out of your home to get supplies. Have food, medicines, and other supplies delivered. You can instead ask someone who does not have COVID-19 to get items you need.    What you need to know about COVID-19 vaccines:  Your healthcare provider can give you more information about what to expect, depending on your chronic condition. You are considered fully vaccinated against COVID-19 two weeks after the final dose of any COVID-19 vaccine. Let your healthcare provider know when you have received the final dose of the vaccine. Make a copy of your vaccination card. Keep the original with you in case you need to show it. Keep the copy in a safe place.  Get a COVID-19 vaccine as directed.  Vaccination is recommended for everyone 6 months or older. COVID-19 vaccines are given as a  shot in 1 to 3 doses as a primary series. This depends on the vaccine brand and the age of the person who receives it. A booster dose is recommended for everyone 5 years or older after the primary series is complete. A second booster  is recommended for all adults 50 or older and for immunocompromised adolescents. The second booster is also recommended for anyone who got the 1-dose brand of vaccine for the first dose and a booster. Your provider can give you more information on boosters and help you schedule all needed doses.         Continue to protect yourself and others.  You can become infected even after you get the vaccine. You may also be able to pass the virus to others without knowing you are infected.    After you get the vaccine, check local, national, and international travel rules.  You may need to be tested before you travel. Some countries require proof of a negative test before you travel. You may also need to quarantine after you return.    Medicine may be given to prevent infection.  The medicine can be given if you are at high risk for infection and cannot get the vaccine. It can also be given if your immune system does not respond well to the vaccine.    Lower your risk for COVID-19:   Stay home if you are sick or think you may have COVID-19.  It is important to stay home if you are waiting for a testing appointment or for test results.    Wash your hands often throughout the day.  Use soap and water. Rub your soapy hands together, lacing your fingers, for at least 20 seconds. Rinse with warm, running water. Dry your hands with a clean towel or paper towel. Use hand  that contains alcohol if soap and water are not available. Teach children how to wash their hands and use hand .         Cover sneezes and coughs.  Turn your face away and cover your mouth and nose with a tissue. Throw the tissue away. Use the bend of your arm if a tissue is not available. Then wash your hands with  soap and water or use hand . Teach children how to cover a cough or sneeze.    Wear a face covering (mask) when needed.  Use a cloth covering with at least 2 layers. You can also create layers by putting a cloth covering over a disposable non-medical mask. Cover your mouth and your nose.         Try to keep space between you and others when you are out of the house.  Avoid crowds as much as possible. Wear a face covering when you must be around a large group and cannot keep space between you and others.    Clean and disinfect high-touch surfaces and objects in your home often.  Use disinfecting wipes, or make a solution by mixing 4 teaspoons of bleach with 1 quart (4 cups) of water. Do not  use any cleaning or disinfecting products that can trigger an asthma attack or other breathing problems. Open windows or have circulating air as you clean. Do not  mix ammonia with bleach. This will create toxic fumes.       Help strengthen your immune system:   Ask about other vaccines you may need.  Get the influenza (flu) vaccine as soon as recommended each year, usually starting in September or October. Get the pneumonia vaccine if recommended. Your healthcare provider can tell you if you should also get other vaccines, and when to get them.    Do not smoke.  Nicotine and other chemicals in cigarettes and cigars can increase your risk for infection and for serious COVID-19 effects. Ask your healthcare provider for information if you currently smoke and need help to quit. E-cigarettes or smokeless tobacco still contain nicotine. Talk to your healthcare provider before you use these products.    Eat a variety of healthy foods.  Examples include vegetables, fruits, whole-grain breads and cereals, lean meats and poultry, fish, low-fat dairy products, and cooked beans. Healthy foods contain nutrients that help keep your immune system strong.         Find ways to manage stress.  Talk to your healthcare providers about ways  to manage stress. Stress can lead to breathing problems or trigger an attack or exacerbation of many health conditions. Do things you enjoy to help you relax. For example, talk to a friend, read a book or magazine, or listen to soothing music.    Follow up with your doctor or specialist as directed:  Your providers will tell you when you can come in for tests, procedures, or check-ups. Bring your symptom record with you to all appointments. Write down your questions so you remember to ask them during your visits.  For more information:   Centers for Disease Control and Prevention  [address] [place] [address]  Phone: 1- 573 - 9994985  Phone: 9- 484 - 8112488  Web Address: http://www.cdc.gov    © Copyright Merative 2023 Information is for End User's use only and may not be sold, redistributed or otherwise used for commercial purposes.  The above information is an  only. It is not intended as medical advice for individual conditions or treatments. Talk to your doctor, nurse or pharmacist before following any medical regimen to see if it is safe and effective for you.

## 2023-12-17 NOTE — PROGRESS NOTES
[facility]        NAME: Velma Osorio is a 43 y.o. female  : 1980    MRN: [id number]  DATE: 2023  TIME: 8:35 AM    Assessment and Plan   Lab test positive for detection of COVID-19 virus [U07.1]  1. Lab test positive for detection of COVID-19 virus  Poct Covid 19 Rapid Antigen Test      2. Sore throat  POCT rapid strepA    Poct Covid 19 Rapid Antigen Test            Patient Instructions     Discussed antiviral therapy such as Paxlovid however patient declined.    Patient prefer to wait and watch approach  Reassurance  Supportive measures discussed  Rest  Remain well-hydrated and drink plenty of fluids    Follow up with PCP in 3-5 days.  Proceed to  ER if symptoms worsen.    Chief Complaint     Chief Complaint   Patient presents with    Sore Throat     Symptoms started 2 days ago.C/o of congestion. Otc taken.          History of Present Illness       Sore Throat   This is a new problem. The current episode started in the past 7 days. The problem has been gradually worsening. There has been no fever. The pain is at a severity of 5/10. Associated symptoms include congestion, coughing, a hoarse voice, shortness of breath and trouble swallowing. Pertinent negatives include no abdominal pain, diarrhea, drooling, ear discharge, ear pain, headaches, plugged ear sensation, neck pain, stridor or vomiting. She has had no exposure to strep or mono.       Review of Systems   Review of Systems   Constitutional:  Positive for chills. Negative for activity change, appetite change and fever.   HENT:  Positive for congestion, hoarse voice, postnasal drip, sore throat and trouble swallowing. Negative for drooling, ear discharge, ear pain and rhinorrhea.    Respiratory:  Positive for cough and shortness of breath. Negative for stridor.    Gastrointestinal:  Negative for abdominal pain, diarrhea and vomiting.   Musculoskeletal:  Negative for neck pain.   Skin:  Negative for rash.   Neurological:   Negative for headaches.         Current Medications       Current Outpatient Medications:     fluconazole (DIFLUCAN) 150 mg tablet, , Disp: , Rfl:     albuterol (PROVENTIL HFA,VENTOLIN HFA) 90 mcg/act inhaler, Inhale 1 puff every 6 (six) hours as needed, Disp: , Rfl:     cetirizine (ZyrTEC) 10 mg tablet, Take by mouth, Disp: , Rfl:     loratadine (CLARITIN) 10 mg tablet, Take 10 mg by mouth, Disp: , Rfl:     valACYclovir (VALTREX) 500 mg tablet, Take 1 tablet (500 mg total) by mouth 2 (two) times a day for 3 days, Disp: 6 tablet, Rfl: 1    Current Allergies     Allergies as of 12/17/2023 - Reviewed 12/17/2023   Allergen Reaction Noted    Penicillins Itching 05/09/2019            The following portions of the patient's history were reviewed and updated as appropriate: allergies, current medications, past family history, past medical history, past social history, past surgical history and problem list.     Past Medical History:   Diagnosis Date    Allergic        Past Surgical History:   Procedure Laterality Date    BACK SURGERY      SLEEVE GASTROPLASTY      TONSILLECTOMY         Family History   Problem Relation Age of Onset    Hypertension Mother     Hypertension Father     Leukemia Father     No Known Problems Maternal Grandmother     No Known Problems Maternal Grandfather     No Known Problems Paternal Grandmother     Prostate cancer Paternal Grandfather     No Known Problems Brother     No Known Problems Maternal Aunt     No Known Problems Paternal Aunt     No Known Problems Paternal Aunt     Breast cancer Neg Hx          Medications have been verified.        Objective   /80   Pulse 88   Temp 99.2 °F (37.3 °C)   Resp 18   Wt 121 kg (267 lb)   LMP 11/17/2023 (Exact Date)   SpO2 96%   BMI 41.20 kg/m²        Physical Exam     Physical Exam  Vitals and nursing note reviewed.   Constitutional:       General: She is not in acute distress.     Appearance: She is well-developed. She is not ill-appearing.    HENT:      Head: Normocephalic and atraumatic.      Right Ear: Tympanic membrane and ear canal normal.      Left Ear: Tympanic membrane and ear canal normal.      Nose: Congestion and rhinorrhea present.      Mouth/Throat:      Mouth: Mucous membranes are moist. Oral lesions present.      Pharynx: Posterior oropharyngeal erythema present. No pharyngeal swelling, oropharyngeal exudate or uvula swelling.      Tonsils: No tonsillar exudate or tonsillar abscesses. 0 on the right. 0 on the left.   Eyes:      Extraocular Movements:      Right eye: Normal extraocular motion.      Left eye: Normal extraocular motion.      Conjunctiva/sclera: Conjunctivae normal.      Pupils: Pupils are equal, round, and reactive to light.   Neck:      Thyroid: No thyromegaly.   Cardiovascular:      Rate and Rhythm: Normal rate and regular rhythm.      Heart sounds: Normal heart sounds.   Pulmonary:      Effort: Pulmonary effort is normal. No respiratory distress.      Breath sounds: Normal breath sounds. No wheezing or rhonchi.   Musculoskeletal:      Cervical back: Normal range of motion and neck supple.   Lymphadenopathy:      Cervical: No cervical adenopathy.   Skin:     General: Skin is warm and dry.      Capillary Refill: Capillary refill takes less than 2 seconds.      Findings: No rash.   Neurological:      General: No focal deficit present.      Mental Status: She is alert and oriented to person, place, and time.   Psychiatric:         Mood and Affect: Mood normal.         Behavior: Behavior normal.

## 2023-12-17 NOTE — LETTER
December 17, 2023     Patient: Velma Osorio   YOB: 1980   Date of Visit: 12/17/2023       To Whom it May Concern:    Velma Osorio was seen in my clinic on 12/17/2023. She may return to work on May return to work on December 20, 2023 with a well-fitted mask for an additional 4 days, which then you may remove .    If you have any questions or concerns, please don't hesitate to call.         Sincerely,          ROSALIND LOWE        CC: No Recipients

## 2024-06-06 ENCOUNTER — OFFICE VISIT (OUTPATIENT)
Dept: OBGYN CLINIC | Facility: CLINIC | Age: 44
End: 2024-06-06
Payer: COMMERCIAL

## 2024-06-06 ENCOUNTER — ANNUAL EXAM (OUTPATIENT)
Dept: OBGYN CLINIC | Facility: CLINIC | Age: 44
End: 2024-06-06
Payer: COMMERCIAL

## 2024-06-06 ENCOUNTER — APPOINTMENT (OUTPATIENT)
Dept: RADIOLOGY | Facility: CLINIC | Age: 44
End: 2024-06-06
Payer: COMMERCIAL

## 2024-06-06 VITALS
DIASTOLIC BLOOD PRESSURE: 78 MMHG | SYSTOLIC BLOOD PRESSURE: 123 MMHG | BODY MASS INDEX: 40.28 KG/M2 | HEIGHT: 68 IN | HEART RATE: 70 BPM | WEIGHT: 265.8 LBS

## 2024-06-06 VITALS
HEIGHT: 68 IN | WEIGHT: 266 LBS | DIASTOLIC BLOOD PRESSURE: 62 MMHG | BODY MASS INDEX: 40.32 KG/M2 | SYSTOLIC BLOOD PRESSURE: 112 MMHG

## 2024-06-06 DIAGNOSIS — S93.421A SPRAIN OF DELTOID LIGAMENT OF RIGHT ANKLE, INITIAL ENCOUNTER: ICD-10-CM

## 2024-06-06 DIAGNOSIS — M76.821 TIBIALIS POSTERIOR TENDINITIS, RIGHT: ICD-10-CM

## 2024-06-06 DIAGNOSIS — Z12.31 SCREENING MAMMOGRAM, ENCOUNTER FOR: ICD-10-CM

## 2024-06-06 DIAGNOSIS — Z01.419 ENCOUNTER FOR GYNECOLOGICAL EXAMINATION WITHOUT ABNORMAL FINDING: Primary | ICD-10-CM

## 2024-06-06 DIAGNOSIS — Z11.3 ROUTINE SCREENING FOR STI (SEXUALLY TRANSMITTED INFECTION): ICD-10-CM

## 2024-06-06 DIAGNOSIS — S96.911A RIGHT ANKLE STRAIN, INITIAL ENCOUNTER: Primary | ICD-10-CM

## 2024-06-06 DIAGNOSIS — M25.571 ACUTE RIGHT ANKLE PAIN: ICD-10-CM

## 2024-06-06 PROCEDURE — 99204 OFFICE O/P NEW MOD 45 MIN: CPT | Performed by: FAMILY MEDICINE

## 2024-06-06 PROCEDURE — G0145 SCR C/V CYTO,THINLAYER,RESCR: HCPCS | Performed by: OBSTETRICS & GYNECOLOGY

## 2024-06-06 PROCEDURE — 87491 CHLMYD TRACH DNA AMP PROBE: CPT | Performed by: OBSTETRICS & GYNECOLOGY

## 2024-06-06 PROCEDURE — 87563 M. GENITALIUM AMP PROBE: CPT | Performed by: OBSTETRICS & GYNECOLOGY

## 2024-06-06 PROCEDURE — 87591 N.GONORRHOEAE DNA AMP PROB: CPT | Performed by: OBSTETRICS & GYNECOLOGY

## 2024-06-06 PROCEDURE — G0476 HPV COMBO ASSAY CA SCREEN: HCPCS | Performed by: OBSTETRICS & GYNECOLOGY

## 2024-06-06 PROCEDURE — 99396 PREV VISIT EST AGE 40-64: CPT | Performed by: OBSTETRICS & GYNECOLOGY

## 2024-06-06 PROCEDURE — 87661 TRICHOMONAS VAGINALIS AMPLIF: CPT | Performed by: OBSTETRICS & GYNECOLOGY

## 2024-06-06 PROCEDURE — 73610 X-RAY EXAM OF ANKLE: CPT

## 2024-06-06 RX ORDER — NAPROXEN 500 MG/1
500 TABLET ORAL 2 TIMES DAILY WITH MEALS
Qty: 30 TABLET | Refills: 0 | Status: SHIPPED | OUTPATIENT
Start: 2024-06-06

## 2024-06-06 NOTE — PROGRESS NOTES
Assessment/Plan:  Assessment & Plan   Diagnoses and all orders for this visit:    Right ankle strain, initial encounter  -     XR ankle 3+ vw right; Future  -     Brace  -     naproxen (Naprosyn) 500 mg tablet; Take 1 tablet (500 mg total) by mouth 2 (two) times a day with meals  -     Ambulatory Referral to Physical Therapy; Future    Sprain of deltoid ligament of right ankle, initial encounter  -     Ambulatory Referral to Physical Therapy; Future    Tibialis posterior tendinitis, right  -     Ambulatory Referral to Physical Therapy; Future      44-year-old female with history of right ankle pain following injury more than 4 years ago, with worsening from injury few weeks ago.  Discussed with patient physical exam, radiographs, impression, and plan.  X-rays of the right ankle are unremarkable for acute osseous abnormality or significant degenerative changes.  There are plantar and retrocalcaneal spurs.  Physical exam right ankle noted for mild anterior medial soft tissue swelling.  She has mild tenderness at the medial malleolus, posterior tibial tendon, and extensor digitorum.  She limited range of motion with dorsiflexion and eversion, however intact strength of the foot and ankle.  She has normal sensation and capillary refill.  Passive external rotation and syndesmosis squeeze are unremarkable.  Clinical impression is that she has symptoms from strain and sprain.  I discussed treatment regimen of bracing, anti-inflammatory, supplements, and formal therapy.  Surgery is not warranted.  I provided lace up ankle brace to wear during physical activity.  She is to take naproxen 500 mg twice daily with food for 2 weeks.  She is to apply topical diclofenac gel 3 times daily for the next 10 days.  She is to take turmeric, tart cherry, and glucosamine supplements.  Recommend she visit athletic shoe store to be advised on supportive footwear.  She is to start physical therapy as soon as possible and do home exercises as  "directed.  She will follow-up as needed.            Subjective:   Patient ID: Velma Osorio is a 44 y.o. female.  Chief Complaint   Patient presents with    Right Ankle - Pain        44-year-old female with history of right ankle pain sustained from injury more than 4 years ago presents for worsening of right ankle pain from injury 3 weeks ago.  She think she may have stepped awkwardly and twisted her ankle.  She had pain described as sudden in onset, localized to the anterior medial aspect the ankle, radiating proximally to the lower leg, sharp and shooting, associated with swelling, worse with bearing weight and ambulating, and improved with resting.  She has been elevating and icing, but does not typically take medications.  She states that prior to this injury she would experience intermittent pain particularly on days of rainy weather.    Ankle Pain  This is a chronic problem. The current episode started more than 1 year ago. The problem occurs daily. The problem has been gradually worsening. Associated symptoms include arthralgias and joint swelling. Pertinent negatives include no numbness or weakness. The symptoms are aggravated by twisting, standing and walking. She has tried rest, position changes and ice for the symptoms. The treatment provided mild relief.           The following portions of the patient's history were reviewed and updated as appropriate: She  has a past medical history of Allergic.  She is allergic to penicillins..    Review of Systems   Musculoskeletal:  Positive for arthralgias and joint swelling.   Neurological:  Negative for weakness and numbness.       Objective:  Vitals:    06/06/24 0804   BP: 123/78   Pulse: 70   Weight: 121 kg (265 lb 12.8 oz)   Height: 5' 7.5\" (1.715 m)      Right Ankle Exam     Range of Motion   Dorsiflexion:  10   Plantar flexion:  normal   Eversion:  5   Inversion:  normal     Muscle Strength   Dorsiflexion:  5/5  Plantar flexion:  5/5    Other   Sensation: " normal     Comments:  Normal capillary refill      Right Knee Exam     Muscle Strength   The patient has normal right knee strength.    Tenderness   The patient is experiencing no tenderness.     Range of Motion   Extension:  normal           Observations     Right Ankle/Foot   Negative for deformity.     Tenderness     Right Ankle/Foot   Tenderness in the anterior ankle, medial malleolus and posterior tibial tendon. No tenderness in the Achilles insertion, anterior talofibular ligament, fifth metatarsal base, deltoid ligament, dorsum foot, lateral malleolus, navicular, peroneal tendon, posterior talofibular ligament and proximal Achilles.     Strength/Myotome Testing     Right Ankle/Foot   Dorsiflexion: 5  Plantar flexion: 5  Inversion: 5  Eversion: 5    Tests     Right Ankle/Foot   Negative for anterior drawer, calcaneal squeeze, metatarsal squeeze, posterior drawer, syndesmosis squeeze and syndesmosis external rotation.       Physical Exam  Vitals and nursing note reviewed.   Constitutional:       Appearance: Normal appearance. She is well-developed. She is not ill-appearing or diaphoretic.   HENT:      Head: Normocephalic and atraumatic.      Right Ear: External ear normal.      Left Ear: External ear normal.   Eyes:      Conjunctiva/sclera: Conjunctivae normal.   Neck:      Trachea: No tracheal deviation.   Cardiovascular:      Rate and Rhythm: Normal rate.   Pulmonary:      Effort: Pulmonary effort is normal. No respiratory distress.   Abdominal:      General: There is no distension.   Musculoskeletal:         General: Swelling and tenderness present.      Right ankle: Tenderness present over the medial malleolus. No lateral malleolus, ATF ligament, posterior TF ligament or base of 5th metatarsal tenderness. Anterior drawer test negative.      Right foot: No deformity.   Skin:     General: Skin is warm and dry.      Coloration: Skin is not jaundiced or pale.   Neurological:      Mental Status: She is alert and  oriented to person, place, and time.   Psychiatric:         Mood and Affect: Mood normal.         Behavior: Behavior normal.         Thought Content: Thought content normal.         Judgment: Judgment normal.         I have personally reviewed pertinent films in PACS and my interpretation is  .  X-rays of the right ankle are unremarkable for acute osseous abnormality or significant degenerative changes.  There are plantar and retrocalcaneal spurs.    More than 45 minutes were spent with reviewing patient chart, reviewing and interpreting imaging studies, obtaining history from patient, examining patient, discussing and implementing treatment plan.

## 2024-06-06 NOTE — PATIENT INSTRUCTIONS
Over the counter vitamins:    - Turmeric vitamin at least 1000 mg daily    - Tart cherry vitamin at least 1000 mg daily    - Glucosamine-chondrointin 2-3 times daily    Over the counter diclofenac gel/voltaren  - 3 times daily for 10 days, then as needed    Ankle Brace during physical activity    [employer] [facility] in [place]

## 2024-06-06 NOTE — PROGRESS NOTES
"Snow Osorio is a 44 y.o. female who presents for annual well woman exam. Periods are regular every 28-30 days, lasting 5 days. No intermenstrual bleeding, spotting, or discharge.    Current contraception: none  History of abnormal Pap smear: yes -   Family history of uterine or ovarian cancer: no  Family history of breast cancer: no    Menstrual History:  OB History          0    Para   0    Term   0       0    AB   0    Living   0         SAB   0    IAB   0    Ectopic   0    Multiple   0    Live Births   0                  Patient's last menstrual period was 2024.  Period Cycle (Days): 28  Period Duration (Days): 5-7  Period Pattern: Regular  Menstrual Flow: Light, Moderate  Dysmenorrhea: (!) Mild  Dysmenorrhea Symptoms: Cramping    The following portions of the patient's history were reviewed and updated as appropriate: allergies, current medications, past family history, past medical history, past social history, past surgical history, and problem list.    Review of Systems  Review of Systems   Constitutional:  Negative for appetite change, chills, fatigue and fever.   Respiratory:  Negative for apnea, cough, chest tightness and shortness of breath.    Cardiovascular:  Negative for chest pain, palpitations and leg swelling.   Gastrointestinal:  Negative for abdominal pain, constipation, diarrhea, nausea and vomiting.   Genitourinary:  Negative for difficulty urinating, dysuria, flank pain, frequency, hematuria, urgency, vaginal bleeding, vaginal discharge and vaginal pain.   Neurological:  Negative for seizures, syncope, light-headedness, numbness and headaches.   Psychiatric/Behavioral:  Negative for agitation and confusion.           Objective      /62 (BP Location: Left arm, Patient Position: Sitting, Cuff Size: Adult)   Ht 5' 7.5\" (1.715 m)   Wt 121 kg (266 lb)   LMP 2024   BMI 41.05 kg/m²     Physical Exam  OBGyn Exam     General:   alert and oriented, " in no acute distress, alert, appears stated age, and cooperative   Heart: regular rate and rhythm, S1, S2 normal, no murmur, click, rub or gallop   Lungs: clear to auscultation bilaterally   Abdomen: soft, non-tender, without masses or organomegaly   Vulva: normal   Vagina: normal mucosa, normal discharge   Cervix: no cervical motion tenderness and no lesions   Uterus: normal size   Adnexa:  Breast Exam:  normal adnexa  breasts appear normal, no suspicious masses, no skin or nipple changes or axillary nodes.                Assessment      @well woman@ .    44-year-old female  Annual exam   History of gastric sleeve   Genital herpes   Had  egg freeze   HPV positive/Pap-20 21  Plan   Desires STD check    Pap/HPV   Diet/ exercise  Calcium/ vitamin-D  Mammogram  Return to office for annual exam       All questions answered.     There are no Patient Instructions on file for this visit.

## 2024-06-07 LAB
C TRACH DNA SPEC QL NAA+PROBE: NEGATIVE
HPV HR 12 DNA CVX QL NAA+PROBE: NEGATIVE
HPV16 DNA CVX QL NAA+PROBE: NEGATIVE
HPV18 DNA CVX QL NAA+PROBE: NEGATIVE
M GENITALIUM DNA SPEC QL NAA+PROBE: NEGATIVE
N GONORRHOEA DNA SPEC QL NAA+PROBE: NEGATIVE
T VAGINALIS DNA SPEC QL NAA+PROBE: NEGATIVE

## 2024-06-13 LAB
LAB AP GYN PRIMARY INTERPRETATION: NORMAL
Lab: NORMAL

## 2024-12-06 ENCOUNTER — HOSPITAL ENCOUNTER (OUTPATIENT)
Age: 44
Discharge: HOME | End: 2024-12-06
Payer: COMMERCIAL

## 2024-12-06 VITALS — BODY MASS INDEX: 40.32 KG/M2 | HEIGHT: 68 IN | WEIGHT: 266 LBS

## 2024-12-06 DIAGNOSIS — Z12.31 SCREENING MAMMOGRAM, ENCOUNTER FOR: ICD-10-CM

## 2024-12-06 PROCEDURE — 77063 BREAST TOMOSYNTHESIS BI: CPT

## 2024-12-06 PROCEDURE — 77067 SCR MAMMO BI INCL CAD: CPT

## 2024-12-11 ENCOUNTER — RESULTS FOLLOW-UP (OUTPATIENT)
Dept: OBGYN CLINIC | Facility: CLINIC | Age: 44
End: 2024-12-11

## 2025-03-01 ENCOUNTER — OFFICE VISIT (OUTPATIENT)
Age: 45
End: 2025-03-01
Payer: COMMERCIAL

## 2025-03-01 VITALS
OXYGEN SATURATION: 97 % | BODY MASS INDEX: 39.81 KG/M2 | TEMPERATURE: 97.8 F | SYSTOLIC BLOOD PRESSURE: 118 MMHG | WEIGHT: 258 LBS | HEART RATE: 77 BPM | DIASTOLIC BLOOD PRESSURE: 80 MMHG | RESPIRATION RATE: 18 BRPM

## 2025-03-01 DIAGNOSIS — R05.9 COUGH, UNSPECIFIED TYPE: Primary | ICD-10-CM

## 2025-03-01 DIAGNOSIS — J11.1 INFLUENZA-LIKE ILLNESS: ICD-10-CM

## 2025-03-01 DIAGNOSIS — J45.909 MILD ASTHMA WITHOUT COMPLICATION, UNSPECIFIED WHETHER PERSISTENT: ICD-10-CM

## 2025-03-01 DIAGNOSIS — R09.81 NASAL CONGESTION: ICD-10-CM

## 2025-03-01 PROCEDURE — 87636 SARSCOV2 & INF A&B AMP PRB: CPT | Performed by: EMERGENCY MEDICINE

## 2025-03-01 PROCEDURE — 99213 OFFICE O/P EST LOW 20 MIN: CPT | Performed by: EMERGENCY MEDICINE

## 2025-03-01 RX ORDER — IBUPROFEN 800 MG/1
800 TABLET, FILM COATED ORAL 2 TIMES DAILY
Qty: 20 TABLET | Refills: 0 | Status: SHIPPED | OUTPATIENT
Start: 2025-03-01 | End: 2025-03-11

## 2025-03-01 RX ORDER — ALBUTEROL SULFATE 90 UG/1
2 INHALANT RESPIRATORY (INHALATION) 4 TIMES DAILY
Qty: 8 G | Refills: 0 | Status: SHIPPED | OUTPATIENT
Start: 2025-03-01

## 2025-03-01 RX ORDER — BROMPHENIRAMINE MALEATE, PSEUDOEPHEDRINE HYDROCHLORIDE, AND DEXTROMETHORPHAN HYDROBROMIDE 2; 30; 10 MG/5ML; MG/5ML; MG/5ML
5 SYRUP ORAL 4 TIMES DAILY PRN
Qty: 120 ML | Refills: 0 | Status: SHIPPED | OUTPATIENT
Start: 2025-03-01

## 2025-03-01 NOTE — PROGRESS NOTES
[facility]        NAME: Velma Osorio is a 45 y.o. female  : 1980    MRN: [id number]  DATE: 2025  TIME: 3:17 PM    Assessment and Plan   Cough, unspecified type [R05.9]  1. Cough, unspecified type  Covid/Flu-Office Collect    brompheniramine-pseudoephedrine-DM 30-2-10 MG/5ML syrup      2. Influenza-like illness  ibuprofen (MOTRIN) 800 mg tablet    brompheniramine-pseudoephedrine-DM 30-2-10 MG/5ML syrup      3. Nasal congestion        4. Mild asthma without complication, unspecified whether persistent  albuterol (PROVENTIL HFA,VENTOLIN HFA) 90 mcg/act inhaler        Covid/flu sent    Patient Instructions     Patient Instructions   Tylenol 650 mg every 4 hours for fever  Motrin twice a day with food  Check MyChart in 24 to 72 hours for your COVID/flu results  Follow-up with your PCP in 2 to 3 days  If symptoms get worse proceed to the ER  Encourage liquids and rest  Hot tea/honey      Follow up with PCP in 3-5 days.  Proceed to  ER if symptoms worsen.    Chief Complaint     Chief Complaint   Patient presents with   • Cough     Chills, body aches, head congestion, cough, chest discomfort relieved by asthma med X 4 days. Negative home COVID test         History of Present Illness       45-year-old female with a chief complaint of cough, congestion, body aches, fever and chills over the past 4 days.  Patient states she did a home COVID test which was negative.  Patient states she has a history of asthma and has had to use her inhaler recently.         Review of Systems   Review of Systems   Constitutional:  Positive for appetite change, fatigue and fever. Negative for chills and diaphoresis.   HENT:  Positive for congestion and sore throat. Negative for ear pain, nosebleeds and rhinorrhea.    Eyes:  Negative for photophobia, pain, discharge and visual disturbance.   Respiratory:  Positive for cough. Negative for choking, chest tightness, shortness of breath and wheezing.    Cardiovascular:   Negative for chest pain and palpitations.   Gastrointestinal:  Negative for abdominal distention, abdominal pain, diarrhea and vomiting.   Endocrine: Negative for polydipsia and polyuria.   Genitourinary:  Negative for dysuria, flank pain, frequency and hematuria.   Musculoskeletal:  Positive for myalgias. Negative for arthralgias, back pain, gait problem, joint swelling and neck stiffness.   Skin:  Negative for color change, rash and wound.   Neurological:  Negative for dizziness, syncope and headaches.   Psychiatric/Behavioral:  Negative for behavioral problems, confusion and suicidal ideas. The patient is not nervous/anxious.    All other systems reviewed and are negative.        Current Medications       Current Outpatient Medications:   •  albuterol (PROVENTIL HFA,VENTOLIN HFA) 90 mcg/act inhaler, Inhale 1 puff every 6 (six) hours as needed, Disp: , Rfl:   •  albuterol (PROVENTIL HFA,VENTOLIN HFA) 90 mcg/act inhaler, Inhale 2 puffs 4 (four) times a day, Disp: 8 g, Rfl: 0  •  brompheniramine-pseudoephedrine-DM 30-2-10 MG/5ML syrup, Take 5 mL by mouth 4 (four) times a day as needed for cough or congestion, Disp: 120 mL, Rfl: 0  •  cetirizine (ZyrTEC) 10 mg tablet, Take by mouth, Disp: , Rfl:   •  ibuprofen (MOTRIN) 800 mg tablet, Take 1 tablet (800 mg total) by mouth 2 (two) times a day for 10 days, Disp: 20 tablet, Rfl: 0  •  fluconazole (DIFLUCAN) 150 mg tablet, , Disp: , Rfl:   •  loratadine (CLARITIN) 10 mg tablet, Take 10 mg by mouth if needed for allergies (Patient not taking: Reported on 3/1/2025), Disp: , Rfl:   •  naproxen (Naprosyn) 500 mg tablet, Take 1 tablet (500 mg total) by mouth 2 (two) times a day with meals (Patient not taking: Reported on 3/1/2025), Disp: 30 tablet, Rfl: 0  •  valACYclovir (VALTREX) 500 mg tablet, Take 1 tablet (500 mg total) by mouth 2 (two) times a day for 3 days (Patient taking differently: Take 500 mg by mouth if needed), Disp: 6 tablet, Rfl: 1    Current Allergies      Allergies as of 03/01/2025 - Reviewed 03/01/2025   Allergen Reaction Noted   • Penicillins Itching 05/09/2019            The following portions of the patient's history were reviewed and updated as appropriate: allergies, current medications, past family history, past medical history, past social history, past surgical history and problem list.     Past Medical History:   Diagnosis Date   • Allergic        Past Surgical History:   Procedure Laterality Date   • BACK SURGERY     • SLEEVE GASTROPLASTY     • TONSILLECTOMY         Family History   Problem Relation Age of Onset   • Hypertension Mother    • Hypertension Father    • Leukemia Father    • No Known Problems Maternal Grandmother    • No Known Problems Maternal Grandfather    • No Known Problems Paternal Grandmother    • Prostate cancer Paternal Grandfather    • No Known Problems Brother    • No Known Problems Maternal Aunt    • No Known Problems Paternal Aunt    • No Known Problems Paternal Aunt    • Breast cancer Neg Hx          Medications have been verified.        Objective   /80 (BP Location: Left arm, Patient Position: Sitting, Cuff Size: Large)   Pulse 77   Temp 97.8 °F (36.6 °C) (Tympanic)   Resp 18   Wt 117 kg (258 lb)   LMP 02/14/2025 (Approximate)   SpO2 97%   BMI 39.81 kg/m²        Physical Exam     Physical Exam  Vitals and nursing note reviewed.   Constitutional:       Appearance: Normal appearance.      Comments: 45-year-old white female sitting on exam table who looks flulike   HENT:      Head: Normocephalic and atraumatic.      Right Ear: Tympanic membrane, ear canal and external ear normal.      Left Ear: Tympanic membrane, ear canal and external ear normal.   Eyes:      Extraocular Movements: Extraocular movements intact.      Pupils: Pupils are equal, round, and reactive to light.   Cardiovascular:      Rate and Rhythm: Normal rate and regular rhythm.      Heart sounds: Normal heart sounds.   Pulmonary:      Effort:  Pulmonary effort is normal.      Breath sounds: Wheezing present.      Comments: Slight expiratory wheezes  Abdominal:      Palpations: Abdomen is soft.   Musculoskeletal:         General: Normal range of motion.      Cervical back: Normal range of motion.   Skin:     General: Skin is warm and dry.      Coloration: Skin is pale.   Neurological:      General: No focal deficit present.      Mental Status: She is alert and oriented to person, place, and time.   Psychiatric:         Mood and Affect: Mood normal.

## 2025-03-01 NOTE — PATIENT INSTRUCTIONS
Tylenol 650 mg every 4 hours for fever  Motrin twice a day with food  Check MyChart in 24 to 72 hours for your COVID/flu results  Follow-up with your PCP in 2 to 3 days  If symptoms get worse proceed to the ER  Encourage liquids and rest  Hot tea/honey

## 2025-03-01 NOTE — LETTER
March 1, 2025     Patient: Velma Osorio   YOB: 1980   Date of Visit: 3/1/2025       To Whom It May Concern:    It is my medical opinion that Velma Osorio should remain out of work until 03/04/2025 .    If you have any questions or concerns, please don't hesitate to call.         Sincerely,        Amairani Choe, DO    CC: No Recipients

## 2025-03-03 LAB
FLUAV RNA RESP QL NAA+PROBE: NEGATIVE
FLUBV RNA RESP QL NAA+PROBE: POSITIVE
SARS-COV-2 RNA RESP QL NAA+PROBE: NEGATIVE

## 2025-04-15 ENCOUNTER — APPOINTMENT (OUTPATIENT)
Age: 45
End: 2025-04-15
Attending: FAMILY MEDICINE
Payer: COMMERCIAL

## 2025-04-15 ENCOUNTER — OFFICE VISIT (OUTPATIENT)
Age: 45
End: 2025-04-15
Payer: COMMERCIAL

## 2025-04-15 VITALS
HEART RATE: 100 BPM | BODY MASS INDEX: 39.43 KG/M2 | OXYGEN SATURATION: 95 % | SYSTOLIC BLOOD PRESSURE: 102 MMHG | TEMPERATURE: 97.5 F | RESPIRATION RATE: 18 BRPM | HEIGHT: 68 IN | DIASTOLIC BLOOD PRESSURE: 60 MMHG | WEIGHT: 260.2 LBS

## 2025-04-15 DIAGNOSIS — E55.9 VITAMIN D DEFICIENCY: ICD-10-CM

## 2025-04-15 DIAGNOSIS — Z11.59 NEED FOR HEPATITIS C SCREENING TEST: ICD-10-CM

## 2025-04-15 DIAGNOSIS — Z13.220 ENCOUNTER FOR LIPID SCREENING FOR CARDIOVASCULAR DISEASE: ICD-10-CM

## 2025-04-15 DIAGNOSIS — E66.813 OBESITY, CLASS III, BMI 40-49.9 (MORBID OBESITY): ICD-10-CM

## 2025-04-15 DIAGNOSIS — Z13.6 ENCOUNTER FOR LIPID SCREENING FOR CARDIOVASCULAR DISEASE: ICD-10-CM

## 2025-04-15 DIAGNOSIS — Z11.4 SCREENING FOR HIV (HUMAN IMMUNODEFICIENCY VIRUS): ICD-10-CM

## 2025-04-15 DIAGNOSIS — Z98.890 HISTORY OF LUMBAR LAMINECTOMY: ICD-10-CM

## 2025-04-15 DIAGNOSIS — Z90.3 H/O GASTRIC SLEEVE: ICD-10-CM

## 2025-04-15 DIAGNOSIS — J45.20 MILD INTERMITTENT ASTHMA WITHOUT COMPLICATION: Chronic | ICD-10-CM

## 2025-04-15 DIAGNOSIS — R73.03 PREDIABETES: Primary | Chronic | ICD-10-CM

## 2025-04-15 DIAGNOSIS — Z00.00 ANNUAL PHYSICAL EXAM: ICD-10-CM

## 2025-04-15 DIAGNOSIS — M71.38 SYNOVIAL CYST OF LUMBAR SPINE: ICD-10-CM

## 2025-04-15 LAB
25(OH)D3 SERPL-MCNC: 27.3 NG/ML (ref 30–100)
ALBUMIN SERPL BCG-MCNC: 4.2 G/DL (ref 3.5–5)
ALP SERPL-CCNC: 87 U/L (ref 34–104)
ALT SERPL W P-5'-P-CCNC: 10 U/L (ref 7–52)
ANION GAP SERPL CALCULATED.3IONS-SCNC: 9 MMOL/L (ref 4–13)
AST SERPL W P-5'-P-CCNC: 20 U/L (ref 13–39)
BASOPHILS # BLD AUTO: 0.04 THOUSANDS/ÂΜL (ref 0–0.1)
BASOPHILS NFR BLD AUTO: 1 % (ref 0–1)
BILIRUB SERPL-MCNC: 0.88 MG/DL (ref 0.2–1)
BUN SERPL-MCNC: 8 MG/DL (ref 5–25)
CALCIUM SERPL-MCNC: 8.8 MG/DL (ref 8.4–10.2)
CHLORIDE SERPL-SCNC: 104 MMOL/L (ref 96–108)
CHOLEST SERPL-MCNC: 188 MG/DL (ref ?–200)
CO2 SERPL-SCNC: 28 MMOL/L (ref 21–32)
CREAT SERPL-MCNC: 0.58 MG/DL (ref 0.6–1.3)
EOSINOPHIL # BLD AUTO: 0.2 THOUSAND/ÂΜL (ref 0–0.61)
EOSINOPHIL NFR BLD AUTO: 4 % (ref 0–6)
ERYTHROCYTE [DISTWIDTH] IN BLOOD BY AUTOMATED COUNT: 13 % (ref 11.6–15.1)
EST. AVERAGE GLUCOSE BLD GHB EST-MCNC: 114 MG/DL
GFR SERPL CREATININE-BSD FRML MDRD: 112 ML/MIN/1.73SQ M
GLUCOSE P FAST SERPL-MCNC: 91 MG/DL (ref 65–99)
HBA1C MFR BLD: 5.6 %
HCT VFR BLD AUTO: 38.6 % (ref 34.8–46.1)
HDLC SERPL-MCNC: 70 MG/DL
HGB BLD-MCNC: 12.3 G/DL (ref 11.5–15.4)
IMM GRANULOCYTES # BLD AUTO: 0.01 THOUSAND/UL (ref 0–0.2)
IMM GRANULOCYTES NFR BLD AUTO: 0 % (ref 0–2)
LDLC SERPL CALC-MCNC: 105 MG/DL (ref 0–100)
LYMPHOCYTES # BLD AUTO: 1.56 THOUSANDS/ÂΜL (ref 0.6–4.47)
LYMPHOCYTES NFR BLD AUTO: 32 % (ref 14–44)
MCH RBC QN AUTO: 30.2 PG (ref 26.8–34.3)
MCHC RBC AUTO-ENTMCNC: 31.9 G/DL (ref 31.4–37.4)
MCV RBC AUTO: 95 FL (ref 82–98)
MONOCYTES # BLD AUTO: 0.27 THOUSAND/ÂΜL (ref 0.17–1.22)
MONOCYTES NFR BLD AUTO: 6 % (ref 4–12)
NEUTROPHILS # BLD AUTO: 2.77 THOUSANDS/ÂΜL (ref 1.85–7.62)
NEUTS SEG NFR BLD AUTO: 57 % (ref 43–75)
NRBC BLD AUTO-RTO: 0 /100 WBCS
PLATELET # BLD AUTO: 281 THOUSANDS/UL (ref 149–390)
PMV BLD AUTO: 11.4 FL (ref 8.9–12.7)
POTASSIUM SERPL-SCNC: 4.2 MMOL/L (ref 3.5–5.3)
PROT SERPL-MCNC: 7 G/DL (ref 6.4–8.4)
RBC # BLD AUTO: 4.07 MILLION/UL (ref 3.81–5.12)
SODIUM SERPL-SCNC: 141 MMOL/L (ref 135–147)
TRIGL SERPL-MCNC: 63 MG/DL (ref ?–150)
TSH SERPL DL<=0.05 MIU/L-ACNC: 1.19 UIU/ML (ref 0.45–4.5)
WBC # BLD AUTO: 4.85 THOUSAND/UL (ref 4.31–10.16)

## 2025-04-15 PROCEDURE — 86803 HEPATITIS C AB TEST: CPT

## 2025-04-15 PROCEDURE — 83695 ASSAY OF LIPOPROTEIN(A): CPT | Performed by: FAMILY MEDICINE

## 2025-04-15 PROCEDURE — 80061 LIPID PANEL: CPT | Performed by: FAMILY MEDICINE

## 2025-04-15 PROCEDURE — 85025 COMPLETE CBC W/AUTO DIFF WBC: CPT | Performed by: FAMILY MEDICINE

## 2025-04-15 PROCEDURE — 82172 ASSAY OF APOLIPOPROTEIN: CPT | Performed by: FAMILY MEDICINE

## 2025-04-15 PROCEDURE — 84443 ASSAY THYROID STIM HORMONE: CPT | Performed by: FAMILY MEDICINE

## 2025-04-15 PROCEDURE — 87389 HIV-1 AG W/HIV-1&-2 AB AG IA: CPT

## 2025-04-15 PROCEDURE — 82306 VITAMIN D 25 HYDROXY: CPT | Performed by: FAMILY MEDICINE

## 2025-04-15 PROCEDURE — 36415 COLL VENOUS BLD VENIPUNCTURE: CPT

## 2025-04-15 PROCEDURE — 80053 COMPREHEN METABOLIC PANEL: CPT | Performed by: FAMILY MEDICINE

## 2025-04-15 PROCEDURE — 99386 PREV VISIT NEW AGE 40-64: CPT | Performed by: FAMILY MEDICINE

## 2025-04-15 PROCEDURE — 99214 OFFICE O/P EST MOD 30 MIN: CPT | Performed by: FAMILY MEDICINE

## 2025-04-15 PROCEDURE — 83036 HEMOGLOBIN GLYCOSYLATED A1C: CPT | Performed by: FAMILY MEDICINE

## 2025-04-15 NOTE — ASSESSMENT & PLAN NOTE
Will follow-up with updated labs  Orders:  •  Comprehensive metabolic panel  •  CBC and differential  •  TSH, 3rd generation with Free T4 reflex  •  Hemoglobin A1C

## 2025-04-15 NOTE — PATIENT INSTRUCTIONS
"Patient Education     Routine physical for adults   The Basics   Written by the doctors and editors at [facility]   What is a physical? -- A physical is a routine visit, or \"check-up,\" with your doctor. You might also hear it called a \"wellness visit\" or \"preventive visit.\"  During each visit, the doctor will:   Ask about your physical and mental health   Ask about your habits, behaviors, and lifestyle   Do an exam   Give you vaccines if needed   Talk to you about any medicines you take   Give advice about your health   Answer your questions  Getting regular check-ups is an important part of taking care of your health. It can help your doctor find and treat any problems you have. But it's also important for preventing health problems.  A routine physical is different from a \"sick visit.\" A sick visit is when you see a doctor because of a health concern or problem. Since physicals are scheduled ahead of time, you can think about what you want to ask the doctor.  How often should I get a physical? -- It depends on your age and health. In general, for people age 21 years and older:   If you are younger than 50 years, you might be able to get a physical every 3 years.   If you are 50 years or older, your doctor might recommend a physical every year.  If you have an ongoing health condition, like diabetes or high blood pressure, your doctor will probably want to see you more often.  What happens during a physical? -- In general, each visit will include:   Physical exam - The doctor or nurse will check your height, weight, heart rate, and blood pressure. They will also look at your eyes and ears. They will ask about how you are feeling and whether you have any symptoms that bother you.   Medicines - It's a good idea to bring a list of all the medicines you take to each doctor visit. Your doctor will talk to you about your medicines and answer any questions. Tell them if you are having any side effects that bother you. You " "should also tell them if you are having trouble paying for any of your medicines.   Habits and behaviors - This includes:   Your diet   Your exercise habits   Whether you smoke, drink alcohol, or use drugs   Whether you are sexually active   Whether you feel safe at home  Your doctor will talk to you about things you can do to improve your health and lower your risk of health problems. They will also offer help and support. For example, if you want to quit smoking, they can give you advice and might prescribe medicines. If you want to improve your diet or get more physical activity, they can help you with this, too.   Lab tests, if needed - The tests you get will depend on your age and situation. For example, your doctor might want to check your:   Cholesterol   Blood sugar   Iron level   Vaccines - The recommended vaccines will depend on your age, health, and what vaccines you already had. Vaccines are very important because they can prevent certain serious or deadly infections.   Discussion of screening - \"Screening\" means checking for diseases or other health problems before they cause symptoms. Your doctor can recommend screening based on your age, risk, and preferences. This might include tests to check for:   Cancer, such as breast, prostate, cervical, ovarian, colorectal, prostate, lung, or skin cancer   Sexually transmitted infections, such as chlamydia and gonorrhea   Mental health conditions like depression and anxiety  Your doctor will talk to you about the different types of screening tests. They can help you decide which screenings to have. They can also explain what the results might mean.   Answering questions - The physical is a good time to ask the doctor or nurse questions about your health. If needed, they can refer you to other doctors or specialists, too.  Adults older than 65 years often need other care, too. As you get older, your doctor will talk to you about:   How to prevent falling at " home   Hearing or vision tests   Memory testing   How to take your medicines safely   Making sure that you have the help and support you need at home  All topics are updated as new evidence becomes available and our peer review process is complete.  This topic retrieved from [employer] on: May 02, 2024.  Topic 530847 Version 1.0  Release: 32.4.3 - C32.122  © 2024 UpToDate, Inc. and/or its affiliates. All rights reserved.  Consumer Information Use and Disclaimer   Disclaimer: This generalized information is a limited summary of diagnosis, treatment, and/or medication information. It is not meant to be comprehensive and should be used as a tool to help the user understand and/or assess potential diagnostic and treatment options. It does NOT include all information about conditions, treatments, medications, side effects, or risks that may apply to a specific patient. It is not intended to be medical advice or a substitute for the medical advice, diagnosis, or treatment of a health care provider based on the health care provider's examination and assessment of a patient's specific and unique circumstances. Patients must speak with a health care provider for complete information about their health, medical questions, and treatment options, including any risks or benefits regarding use of medications. This information does not endorse any treatments or medications as safe, effective, or approved for treating a specific patient. UpToDate, Inc. and its affiliates disclaim any warranty or liability relating to this information or the use thereof.The use of this information is governed by the Terms of Use, available at https://www.woltersCelebrations.comuwer.com/en/know/clinical-effectiveness-terms. 2024© UpToDate, Inc. and its affiliates and/or licensors. All rights reserved.  Copyright   © 2024 UpToDate, Inc. and/or its affiliates. All rights reserved.

## 2025-04-15 NOTE — ASSESSMENT & PLAN NOTE
Currently prescribed the following:  Albuterol inhaler as needed  Today patient reports  Reports taking as prescribed.  Assessment&Plan: Controlled

## 2025-04-15 NOTE — ASSESSMENT & PLAN NOTE
Wt Readings from Last 3 Encounters:   04/15/25 118 kg (260 lb 3.2 oz)   03/01/25 117 kg (258 lb)   12/06/24 121 kg (266 lb)     Initial weight (04/15/25): 260 lbs, Body mass index is 40.15 kg/m².   TWL: - lbs    History:   Previously BMI in the 50s in 2016.    Currently working on lifestyle improvement only.   Assessment: Improving weight/BMI.   Med management:None. Work on lifestyle improvement and complete labs as discussed.   Will consider metformin at next appointment.  Recommended focusing on building good habits over time by setting and being consistent with realistic goals.   Improve nutritional intake (recommended Mediterranean diet, low-carb diet)  Calorie control (creating a modest calorie deficit of 500-1000 -calorie/day)  Monitoring portion size and frequency of intake, limiting snacking as discussed  Aiming for Whole Foods and healthy fats  Limiting added sugars and processed foods as discussed  Exercising and move body throughout the day and regularly as discussed  Prioritize sleep and mental health  Return in 3 months for close monitoring.  Complete blood work in the meantime.     Orders:  •  TSH, 3rd generation with Free T4 reflex

## 2025-04-15 NOTE — PROGRESS NOTES
Adult Annual Physical  Name: Velma Osorio      : 1980      MRN: [id number]  Encounter Provider: Darien Gee MD  Encounter Date: 4/15/2025   Encounter department: [facility]IMARY C[facility] [place]    :  Assessment & Plan  Prediabetes  Will follow-up with updated labs  Orders:  •  Comprehensive metabolic panel  •  CBC and differential  •  TSH, 3rd generation with Free T4 reflex  •  Hemoglobin A1C    Mild intermittent asthma without complication  Currently prescribed the following:  Albuterol inhaler as needed  Today patient reports  Reports taking as prescribed.  Assessment&Plan: Controlled       History of lumbar laminectomy  Intermittent flares       H/O gastric sleeve  2016  Went from BMI of 50 to now BMI of 40.       Synovial cyst of lumbar spine  History of synovial cyst which was unable to be removed during lumbar laminectomy.       Obesity, Class III, BMI 40-49.9 (morbid obesity) (HCC)  Wt Readings from Last 3 Encounters:   04/15/25 118 kg (260 lb 3.2 oz)   25 117 kg (258 lb)   24 121 kg (266 lb)     Initial weight (04/15/25): 260 lbs, Body mass index is 40.15 kg/m².   TWL: - lbs    History:   Previously BMI in the 50s in 2016.    Currently working on lifestyle improvement only.   Assessment: Improving weight/BMI.   Med management:None. Work on lifestyle improvement and complete labs as discussed.   Will consider metformin at next appointment.  Recommended focusing on building good habits over time by setting and being consistent with realistic goals.   Improve nutritional intake (recommended Mediterranean diet, low-carb diet)  Calorie control (creating a modest calorie deficit of 500-1000 -calorie/day)  Monitoring portion size and frequency of intake, limiting snacking as discussed  Aiming for Whole Foods and healthy fats  Limiting added sugars and processed foods as discussed  Exercising and move body throughout the day and regularly as discussed  Prioritize sleep and mental  health  Return in 3 months for close monitoring.  Complete blood work in the meantime.     Orders:  •  TSH, 3rd generation with Free T4 reflex    Vitamin D deficiency  Printout provided  Orders:  •  Vitamin D 25 hydroxy    Annual physical exam  Healthcare Maintenance  Health maintenance completed today  - Medical history reviewed, including existing medical conditions, medications, and surgeries.   - Labs discussed to evaluate cholesterol, blood sugar, kidney function, liver function, and other important markers of health.  - BMI evaluated and discussed.  - Lifestyle and health counseling completed including diet, exercise habits, smoking status, alcohol consumption.   - Bone & Heart health reviewed  - Cancer screenings discussed: Mammogram/Pap smear/CT lung/colonoscopy.   - Vaccination status reviewed and pertinent immunizations and booster shots discussed.  - Skin examination: Discussed importance of sunscreen and other preventative measures for skin cancer.  - Mental health and wellbeing evaluated and discussed.  - Family history obtained to identify any of hereditary health risks.  Lab orders in place as discussed  Start/continue preventative measures as discussed/advised  Complete preventative orders in place as recommended.   Refer to screenings problem list   Orders:  •  Comprehensive metabolic panel  •  CBC and differential    Encounter for lipid screening for cardiovascular disease    Orders:  •  Lipid Panel with Direct LDL reflex  •  Apolipoprotein B  •  Lipoprotein A (LPA)    Need for hepatitis C screening test    Orders:  •  Hepatitis C Antibody; Future    Screening for HIV (human immunodeficiency virus)    Orders:  •  HIV 1/2 AG/AB w Reflex [facility] for 2 yr old and above; Future                 Preventive Screenings:  - Diabetes Screening: risks/benefits discussed and orders placed  - Cholesterol Screening: orders placed and risks/benefits discussed   - Hepatitis C screening: risks/benefits discussed,  "patient agrees to screening and orders placed   - HIV screening: risks/benefits discussed, patient agrees to screening and orders placed   - Cervical cancer screening: screening up-to-date   - Breast cancer screening: screening up-to-date   - Colon cancer screening: screening not indicated   - Lung cancer screening: screening not indicated     Immunizations:  - Immunizations due: Influenza, Prevnar 20 and Tdap      Depression Screening and Follow-up Plan: Patient was screened for depression during today's encounter. They screened negative with a PHQ-2 score of 0.          History of Present Illness     Adult Annual Physical:  Patient presents for annual physical.     Diet and Physical Activity:  - Diet/Nutrition: low carb diet, consuming 3-5 servings of fruits/vegetables daily, adequate whole grain intake and adequate fiber intake.  - Exercise: walking and no formal exercise. 4-5k    Depression Screening:  - PHQ-2 Score: 0    General Health:  - Sleep: 4-6 hours of sleep on average, 7-8 hours of sleep on average and sleeps well.  - Hearing: normal hearing bilateral ears.  - Vision: most recent eye exam < 1 year ago.  - Dental: regular dental visits, brushes teeth twice daily and floss regularly.    /GYN Health:  - Follows with GYN: yes.   - Menopause: premenopausal.   - History of STDs: yes    Review of Systems   Respiratory:  Negative for chest tightness and shortness of breath.    Cardiovascular:  Negative for chest pain.   Gastrointestinal:  Negative for abdominal pain, blood in stool, constipation, diarrhea, nausea and vomiting.   Endocrine: Negative for polydipsia and polyuria.   Genitourinary:  Negative for dysuria and hematuria.   Neurological:  Negative for dizziness, weakness, light-headedness and headaches.         Objective   /60 (BP Location: Left arm, Patient Position: Sitting, Cuff Size: Standard)   Pulse 100   Temp 97.5 °F (36.4 °C) (Tympanic)   Resp 18   Ht 5' 7.5\" (1.715 m)   Wt 118 kg " (260 lb 3.2 oz)   SpO2 95%   BMI 40.15 kg/m²     Physical Exam  Vitals reviewed.   Constitutional:       General: She is not in acute distress.     Appearance: Normal appearance. She is obese. She is not ill-appearing, toxic-appearing or diaphoretic.   HENT:      Head: Normocephalic and atraumatic.      Right Ear: External ear normal.      Left Ear: External ear normal.      Nose: Nose normal.      Mouth/Throat:      Mouth: Mucous membranes are moist.   Eyes:      General: No scleral icterus.        Right eye: No discharge.         Left eye: No discharge.      Extraocular Movements: Extraocular movements intact.      Conjunctiva/sclera: Conjunctivae normal.   Cardiovascular:      Rate and Rhythm: Normal rate and regular rhythm.      Pulses: Normal pulses.      Heart sounds: Normal heart sounds.   Pulmonary:      Effort: Pulmonary effort is normal. No respiratory distress.      Breath sounds: Normal breath sounds.   Abdominal:      Palpations: Abdomen is soft.      Tenderness: There is no abdominal tenderness.   Musculoskeletal:         General: No swelling. Normal range of motion.      Cervical back: Normal range of motion.   Skin:     General: Skin is warm and dry.   Neurological:      General: No focal deficit present.      Mental Status: She is alert and oriented to person, place, and time.   Psychiatric:         Mood and Affect: Mood normal.         Behavior: Behavior normal.         Thought Content: Thought content normal.

## 2025-04-16 LAB
HCV AB SER QL: NORMAL
HIV 1+2 AB+HIV1 P24 AG SERPL QL IA: NORMAL
LPA SERPL-SCNC: 25.7 NMOL/L

## 2025-04-17 LAB — APO B SERPL-MCNC: 80 MG/DL

## 2025-04-21 ENCOUNTER — RESULTS FOLLOW-UP (OUTPATIENT)
Age: 45
End: 2025-04-21

## 2025-04-21 DIAGNOSIS — E55.9 VITAMIN D DEFICIENCY: Primary | ICD-10-CM

## 2025-04-21 RX ORDER — CHOLECALCIFEROL (VITAMIN D3) 1250 MCG
50000 CAPSULE ORAL 2 TIMES WEEKLY
Qty: 8 CAPSULE | Refills: 2 | Status: SHIPPED | OUTPATIENT
Start: 2025-04-21 | End: 2025-06-17 | Stop reason: ALTCHOICE

## 2025-06-04 ENCOUNTER — APPOINTMENT (OUTPATIENT)
Age: 45
End: 2025-06-04
Payer: COMMERCIAL

## 2025-06-04 ENCOUNTER — OFFICE VISIT (OUTPATIENT)
Age: 45
End: 2025-06-04
Payer: COMMERCIAL

## 2025-06-04 ENCOUNTER — RESULTS FOLLOW-UP (OUTPATIENT)
Age: 45
End: 2025-06-04

## 2025-06-04 VITALS
RESPIRATION RATE: 18 BRPM | TEMPERATURE: 98.3 F | BODY MASS INDEX: 40.16 KG/M2 | HEIGHT: 68 IN | SYSTOLIC BLOOD PRESSURE: 112 MMHG | OXYGEN SATURATION: 95 % | DIASTOLIC BLOOD PRESSURE: 72 MMHG | WEIGHT: 265 LBS

## 2025-06-04 DIAGNOSIS — J45.31 MILD PERSISTENT ASTHMA WITH EXACERBATION: Primary | ICD-10-CM

## 2025-06-04 DIAGNOSIS — J45.31 MILD PERSISTENT ASTHMA WITH EXACERBATION: ICD-10-CM

## 2025-06-04 DIAGNOSIS — R05.9 COUGH, UNSPECIFIED TYPE: ICD-10-CM

## 2025-06-04 PROCEDURE — 99214 OFFICE O/P EST MOD 30 MIN: CPT

## 2025-06-04 PROCEDURE — 71046 X-RAY EXAM CHEST 2 VIEWS: CPT

## 2025-06-04 RX ORDER — BROMPHENIRAMINE MALEATE, PSEUDOEPHEDRINE HYDROCHLORIDE, AND DEXTROMETHORPHAN HYDROBROMIDE 2; 30; 10 MG/5ML; MG/5ML; MG/5ML
5 SYRUP ORAL 4 TIMES DAILY PRN
Qty: 120 ML | Refills: 0 | Status: SHIPPED | OUTPATIENT
Start: 2025-06-04

## 2025-06-04 RX ORDER — PREDNISONE 20 MG/1
50 TABLET ORAL DAILY
Qty: 13 TABLET | Refills: 0 | Status: SHIPPED | OUTPATIENT
Start: 2025-06-04 | End: 2025-06-09

## 2025-06-04 RX ORDER — DOXYCYCLINE HYCLATE 100 MG
100 TABLET ORAL 2 TIMES DAILY
Qty: 10 TABLET | Refills: 0 | Status: SHIPPED | OUTPATIENT
Start: 2025-06-04 | End: 2025-06-09

## 2025-06-04 NOTE — LETTER
June 4, 2025     Patient: Velma Osorio  YOB: 1980  Date of Visit: 6/4/2025      To Whom it May Concern:    Velma Osorio is under my professional care. Velma was seen in my office on 6/4/2025. Velma may return to work on June 9, 2025.    If you have any questions or concerns, please don't hesitate to call.         Sincerely,          April Felecia Azul PA-C        CC: No Recipients

## 2025-06-04 NOTE — PROGRESS NOTES
Name: Velma Osorio      : 1980      MRN: [id number]  Encounter Provider: Dorene Azul PA-C  Encounter Date: 2025   Encounter department: [facility] PRIMARY CARE [place]  :  Assessment & Plan  Mild persistent asthma with exacerbation  Significant rhonchi and wheezing on exam with fever, obtain chest x-ray to rule out pneumonia  Use albuterol inhaler 2 puffs every 6 hours scheduled for the next 2 to 3 days  Start steroid burst, prednisone 50 mg by mouth in the morning for 5 days.  Side effects discussed including mood changes, increased appetite, insomnia.  If not improving in the next 2 days, start doxycycline twice a day for 5 days  Orders:  •  predniSONE 20 mg tablet; Take 2.5 tablets (50 mg total) by mouth daily for 5 days  •  XR chest pa and lateral; Future  •  doxycycline hyclate (VIBRA-TABS) 100 mg tablet; Take 1 tablet (100 mg total) by mouth 2 (two) times a day for 5 days    Cough, unspecified type  Start prednisone burst  May use cough syrup for cough  Discussed symptomatic treatment with over-the-counter medications, humidification, hydration, and raising head of bed for comfort.  Orders:  •  brompheniramine-pseudoephedrine-DM 30-2-10 MG/5ML syrup; Take 5 mL by mouth 4 (four) times a day as needed for cough or congestion  •  predniSONE 20 mg tablet; Take 2.5 tablets (50 mg total) by mouth daily for 5 days           History of Present Illness   Pt with symptoms     Cough  This is a new problem. The current episode started in the past 7 days. The problem has been gradually worsening. The problem occurs every few minutes. The cough is Non-productive. Associated symptoms include chills, a fever, nasal congestion, postnasal drip, rhinorrhea, a sore throat, shortness of breath, sweats and wheezing. Pertinent negatives include no chest pain, ear congestion, ear pain, headaches, heartburn, hemoptysis, myalgias, rash or weight loss. The symptoms are aggravated by lying down. Risk factors  "for lung disease include travel. She has tried a beta-agonist inhaler for the symptoms. Her past medical history is significant for asthma and environmental allergies.     Review of Systems   Constitutional:  Positive for chills and fever. Negative for weight loss.   HENT:  Positive for postnasal drip, rhinorrhea and sore throat. Negative for ear pain, trouble swallowing and voice change.    Respiratory:  Positive for cough, shortness of breath and wheezing. Negative for hemoptysis and stridor.    Cardiovascular:  Negative for chest pain and palpitations.   Gastrointestinal: Negative.  Negative for heartburn.   Musculoskeletal:  Negative for myalgias.   Skin:  Negative for rash.   Allergic/Immunologic: Positive for environmental allergies.   Neurological:  Negative for headaches.       Objective   /72 (BP Location: Left arm, Patient Position: Sitting, Cuff Size: Standard)   Temp 98.3 °F (36.8 °C) (Tympanic)   Resp 18   Ht 5' 7.5\" (1.715 m)   Wt 120 kg (265 lb)   SpO2 95%   BMI 40.89 kg/m²      Physical Exam  Vitals and nursing note reviewed.   Constitutional:       General: She is not in acute distress.     Appearance: Normal appearance. She is obese. She is ill-appearing. She is not toxic-appearing.   HENT:      Head: Normocephalic and atraumatic.      Right Ear: Hearing, ear canal and external ear normal. There is impacted cerumen.      Left Ear: Hearing, tympanic membrane, ear canal and external ear normal.      Nose: Congestion and rhinorrhea present. Rhinorrhea is clear.      Right Sinus: No maxillary sinus tenderness or frontal sinus tenderness.      Left Sinus: No maxillary sinus tenderness or frontal sinus tenderness.      Mouth/Throat:      Lips: Pink.      Mouth: Mucous membranes are dry. No oral lesions.      Dentition: Normal dentition.      Tongue: No lesions.      Pharynx: Uvula midline. No oropharyngeal exudate, posterior oropharyngeal erythema, uvula swelling or postnasal drip.      " Tonsils: No tonsillar exudate.     Eyes:      General: Lids are normal. Vision grossly intact. No scleral icterus.      Cardiovascular:      Rate and Rhythm: Normal rate and regular rhythm.      Heart sounds: Normal heart sounds.   Pulmonary:      Effort: Pulmonary effort is normal. No respiratory distress.      Breath sounds: No stridor. Wheezing and rhonchi present. No rales.   Abdominal:      General: Bowel sounds are normal.      Palpations: Abdomen is soft.      Tenderness: There is no abdominal tenderness.     Musculoskeletal:         General: No tenderness, deformity or signs of injury.      Cervical back: Normal range of motion and neck supple.   Lymphadenopathy:      Cervical: No cervical adenopathy.     Skin:     General: Skin is warm and dry.      Coloration: Skin is not cyanotic.     Neurological:      Mental Status: She is alert and oriented to person, place, and time.      Gait: Gait is intact.

## 2025-06-16 NOTE — PROGRESS NOTES
Velma Osorio  1980    Assessment and Plan:  Yearly exam without abnormality.     -Pap up to date. Would do q3 year testing at this time, given HR HPV in . Due   -Mammo ordered   -STD screening today  -Family planning/infertility reviewed: reviewed that she meets Dx criteria for infertility, that given her age I would recommend immediate eval by [facility]. As she has already frozen many eggs, we discussed that the recommendation will likely be for use of these, rather than repeating egg retrieval process. Discussed increased risk aneuploidy and miscarriage >age 40, reviewed IVF process with and without egg retrieval. Pt interested in having [facility] run, which was ordered today, but I stressed need to call [facility] asap, regardless of the resul    RTO one year for yearly exam or sooner as needed.        CC:  Yearly exam    S:  45 y.o. female here for yearly exam.     Menarche: 16    LMP 2025 Approx  Contraception: None   - TTC x 1 year  Last Pap: 2024 NILM/HPV-   -  with NILM/HR HPV pos  Last Mammo: 2024 BI-RADS 2    Non-smoker, social drinker  Exercises regularly    Doing ok overall. Starting to wonder about family planning. She has been with her partner for 2 years now and they have been not preventing pregnancy for the past year, without conception. She previously froze her eggs through [facility], but is interested in knowing about her current fertility status.     Her cycles are regular, coming every 21-28 days, not heavy or crampy.     Sexual activity: She is sexually active without pain, bleeding or dryness.     STD testing:  She does want STD testing today.     Gardasil:  She has not had the Gardasil series.     Family hx of breast cancer: No  Family hx of ovarian cancer: No  Family hx of colon cancer: No    Denies hot flushes, dyspareunia, abnormal uterine bleeding, urinary/fecal incontinence, changes in energy levels, mood.     Current Medications[1]  Social History     Socioeconomic  "History    Marital status: Single     Spouse name: Not on file    Number of children: Not on file    Years of education: Not on file    Highest education level: Not on file   Occupational History    Not on file   Tobacco Use    Smoking status: Never    Smokeless tobacco: Never   Vaping Use    Vaping status: Never Used   Substance and Sexual Activity    Alcohol use: Yes     Alcohol/week: 3.0 standard drinks of alcohol     Types: 3 Glasses of wine per week    Drug use: Never    Sexual activity: Yes     Partners: Male     Birth control/protection: Condom Male   Other Topics Concern    Not on file   Social History Narrative    Not on file     Social Drivers of Health     Financial Resource Strain: Not on file   Food Insecurity: Not on file   Transportation Needs: Not on file   Physical Activity: Not on file   Stress: Not on file   Social Connections: Not on file   Intimate Partner Violence: Not on file   Housing Stability: Not on file     Family History[2]   Past Medical History[3]     Review of Systems   Respiratory: Negative.    Cardiovascular: Negative.    Gastrointestinal: Negative for constipation and diarrhea.   Genitourinary: Negative for difficulty urinating, pelvic pain, vaginal bleeding, vaginal discharge, itching or odor.    O:  Blood pressure 100/68, height 5' 7.5\" (1.715 m), weight 122 kg (268 lb), last menstrual period 06/09/2025.    Patient appears well and is not in distress  Neck is supple without masses  Breasts are symmetrical without mass, tenderness, nipple discharge, skin changes or adenopathy.   Abdomen is soft and nontender without masses.   External genitals are normal without lesions or rashes.  Urethral meatus and urethra are normal  Bladder is normal to palpation  Vagina is normal without discharge or bleeding.   Cervix is normal without discharge or lesion.   Uterus is normal, mobile, nontender without palpable mass.  Adnexa are normal, nontender, without palpable mass.          [1] "   Current Outpatient Medications:     albuterol (PROVENTIL HFA,VENTOLIN HFA) 90 mcg/act inhaler, Inhale 1 puff every 6 (six) hours as needed, Disp: , Rfl:     cetirizine (ZyrTEC) 10 mg tablet, Take by mouth (Patient taking differently: Take by mouth if needed), Disp: , Rfl:     valACYclovir (VALTREX) 500 mg tablet, Take 1 tablet (500 mg total) by mouth 2 (two) times a day for 3 days (Patient taking differently: Take 500 mg by mouth 2 (two) times a day as needed), Disp: 6 tablet, Rfl: 1  [2]   Family History  Problem Relation Name Age of Onset    Hypertension Mother Kenyatta     Arthritis Mother Kenyatta         RA    Hypertension Father Wilfredo     Leukemia Father Wilfredo     Asthma Father Wilfredo     No Known Problems Maternal Grandmother      No Known Problems Maternal Grandfather      No Known Problems Paternal Grandmother      Prostate cancer Paternal Grandfather Gilbert     Asthma Brother River     No Known Problems Maternal Aunt      No Known Problems Paternal Aunt      No Known Problems Paternal Aunt      Breast cancer Neg Hx     [3]   Past Medical History:  Diagnosis Date    Allergic     Asthma     Rarely triggered

## 2025-06-17 ENCOUNTER — OFFICE VISIT (OUTPATIENT)
Age: 45
End: 2025-06-17
Payer: COMMERCIAL

## 2025-06-17 ENCOUNTER — APPOINTMENT (OUTPATIENT)
Age: 45
End: 2025-06-17
Payer: COMMERCIAL

## 2025-06-17 VITALS
DIASTOLIC BLOOD PRESSURE: 68 MMHG | SYSTOLIC BLOOD PRESSURE: 100 MMHG | WEIGHT: 268 LBS | BODY MASS INDEX: 40.62 KG/M2 | HEIGHT: 68 IN

## 2025-06-17 DIAGNOSIS — Z12.31 ENCOUNTER FOR SCREENING MAMMOGRAM FOR BREAST CANCER: Primary | ICD-10-CM

## 2025-06-17 DIAGNOSIS — Z31.69 INFERTILITY COUNSELING: ICD-10-CM

## 2025-06-17 DIAGNOSIS — Z11.3 SCREENING FOR STDS (SEXUALLY TRANSMITTED DISEASES): ICD-10-CM

## 2025-06-17 PROCEDURE — 87661 TRICHOMONAS VAGINALIS AMPLIF: CPT | Performed by: STUDENT IN AN ORGANIZED HEALTH CARE EDUCATION/TRAINING PROGRAM

## 2025-06-17 PROCEDURE — 86780 TREPONEMA PALLIDUM: CPT

## 2025-06-17 PROCEDURE — 99213 OFFICE O/P EST LOW 20 MIN: CPT | Performed by: STUDENT IN AN ORGANIZED HEALTH CARE EDUCATION/TRAINING PROGRAM

## 2025-06-17 PROCEDURE — 82166 ASSAY ANTI-MULLERIAN HORM: CPT

## 2025-06-17 PROCEDURE — 87340 HEPATITIS B SURFACE AG IA: CPT | Performed by: STUDENT IN AN ORGANIZED HEALTH CARE EDUCATION/TRAINING PROGRAM

## 2025-06-17 PROCEDURE — 86803 HEPATITIS C AB TEST: CPT | Performed by: STUDENT IN AN ORGANIZED HEALTH CARE EDUCATION/TRAINING PROGRAM

## 2025-06-17 PROCEDURE — 87591 N.GONORRHOEAE DNA AMP PROB: CPT | Performed by: STUDENT IN AN ORGANIZED HEALTH CARE EDUCATION/TRAINING PROGRAM

## 2025-06-17 PROCEDURE — 36415 COLL VENOUS BLD VENIPUNCTURE: CPT

## 2025-06-17 PROCEDURE — 87389 HIV-1 AG W/HIV-1&-2 AB AG IA: CPT

## 2025-06-17 PROCEDURE — 87491 CHLMYD TRACH DNA AMP PROBE: CPT | Performed by: STUDENT IN AN ORGANIZED HEALTH CARE EDUCATION/TRAINING PROGRAM

## 2025-06-17 PROCEDURE — 99396 PREV VISIT EST AGE 40-64: CPT | Performed by: STUDENT IN AN ORGANIZED HEALTH CARE EDUCATION/TRAINING PROGRAM

## 2025-06-18 LAB
HIV 1+2 AB+HIV1 P24 AG SERPL QL IA: NORMAL
TREPONEMA PALLIDUM IGG+IGM AB [PRESENCE] IN SERUM OR PLASMA BY IMMUNOASSAY: NORMAL

## 2025-06-19 ENCOUNTER — RESULTS FOLLOW-UP (OUTPATIENT)
Dept: OBGYN CLINIC | Facility: CLINIC | Age: 45
End: 2025-06-19

## 2025-06-19 LAB
C TRACH DNA SPEC QL NAA+PROBE: NEGATIVE
HBV SURFACE AG SER QL: NORMAL
HCV AB SER QL: NORMAL
N GONORRHOEA DNA SPEC QL NAA+PROBE: NEGATIVE
T VAGINALIS DNA SPEC QL NAA+PROBE: NEGATIVE

## 2025-06-20 LAB — MIS SERPL-MCNC: 0.86 NG/ML

## 2025-07-17 ENCOUNTER — OFFICE VISIT (OUTPATIENT)
Age: 45
End: 2025-07-17
Payer: COMMERCIAL

## 2025-07-17 ENCOUNTER — APPOINTMENT (OUTPATIENT)
Age: 45
End: 2025-07-17
Attending: FAMILY MEDICINE
Payer: COMMERCIAL

## 2025-07-17 VITALS
SYSTOLIC BLOOD PRESSURE: 102 MMHG | DIASTOLIC BLOOD PRESSURE: 70 MMHG | WEIGHT: 271.2 LBS | RESPIRATION RATE: 14 BRPM | TEMPERATURE: 97.5 F | HEIGHT: 68 IN | HEART RATE: 66 BPM | BODY MASS INDEX: 41.1 KG/M2

## 2025-07-17 DIAGNOSIS — E55.9 VITAMIN D DEFICIENCY: ICD-10-CM

## 2025-07-17 DIAGNOSIS — R73.03 PREDIABETES: Chronic | ICD-10-CM

## 2025-07-17 DIAGNOSIS — Z13.220 ENCOUNTER FOR LIPID SCREENING FOR CARDIOVASCULAR DISEASE: ICD-10-CM

## 2025-07-17 DIAGNOSIS — E78.5 DYSLIPIDEMIA: ICD-10-CM

## 2025-07-17 DIAGNOSIS — E66.813 OBESITY, CLASS III, BMI 40-49.9 (MORBID OBESITY): Primary | ICD-10-CM

## 2025-07-17 DIAGNOSIS — Z13.6 ENCOUNTER FOR LIPID SCREENING FOR CARDIOVASCULAR DISEASE: ICD-10-CM

## 2025-07-17 LAB
25(OH)D3 SERPL-MCNC: 44.3 NG/ML (ref 30–100)
EST. AVERAGE GLUCOSE BLD GHB EST-MCNC: 114 MG/DL
HBA1C MFR BLD: 5.6 %

## 2025-07-17 PROCEDURE — 36415 COLL VENOUS BLD VENIPUNCTURE: CPT

## 2025-07-17 PROCEDURE — 83036 HEMOGLOBIN GLYCOSYLATED A1C: CPT | Performed by: FAMILY MEDICINE

## 2025-07-17 PROCEDURE — 99214 OFFICE O/P EST MOD 30 MIN: CPT | Performed by: FAMILY MEDICINE

## 2025-07-17 PROCEDURE — 82306 VITAMIN D 25 HYDROXY: CPT

## 2025-07-17 NOTE — PROGRESS NOTES
Name: Velma Osorio      : 1980      MRN: [id number]  Encounter Provider: Darien Gee MD  Encounter Date: 2025   Encounter department: [facility] PR[facility] [place]  :  Assessment & Plan  Obesity, Class III, BMI 40-49.9 (morbid obesity)  Wt Readings from Last 3 Encounters:   25 123 kg (271 lb 3.2 oz)   25 122 kg (268 lb)   25 120 kg (265 lb)     Initial weight (04/15/25): 260 lbs, Body mass index is 40.15 kg/m².   TWL: - lbs    History:   Previously BMI in the 50s in 2016.    Currently working on lifestyle improvement only.   Assessment: Improving weight/BMI.   Med management:None. Work on lifestyle improvement and complete labs as discussed.   Will consider metformin at next appointment.  Recommended focusing on building good habits over time by setting and being consistent with realistic goals.   Improve nutritional intake (recommended Mediterranean diet, low-carb diet)  Calorie control (creating a modest calorie deficit of 500-1000 -calorie/day)  Monitoring portion size and frequency of intake, limiting snacking as discussed  Aiming for Whole Foods and healthy fats  Limiting added sugars and processed foods as discussed  Exercising and move body throughout the day and regularly as discussed  Prioritize sleep and mental health  Return in 6 months for routine visit.        Orders:  •  Comprehensive metabolic panel; Future  •  CBC and differential; Future     Vitamin D deficiency  Printout provided    Orders:  •  Vitamin D 25 hydroxy; Future     Prediabetes  Get updated labs now and again 6 months.  Orders:  •  Hemoglobin A1C  •  Hemoglobin A1C; Future     Encounter for lipid screening for cardiovascular disease    Orders:  •  Lipid Panel with Direct LDL reflex; Future    Dyslipidemia  Get updated labs in 6 months  Orders:  •  Lipid Panel with Direct LDL reflex; Future  •  TSH, 3rd generation with Free T4 reflex; Future           History of Present Illness   HPI  Review  "of Systems    Objective   /70 (BP Location: Left arm, Patient Position: Sitting)   Pulse 66   Temp 97.5 °F (36.4 °C) (Tympanic)   Resp 14   Ht 5' 7.5\" (1.715 m)   Wt 123 kg (271 lb 3.2 oz)   LMP 06/09/2025 (Approximate)   BMI 41.85 kg/m²      Physical Exam  Vitals reviewed.   Constitutional:       General: She is not in acute distress.     Appearance: Normal appearance. She is obese. She is not ill-appearing, toxic-appearing or diaphoretic.   HENT:      Head: Normocephalic and atraumatic.      Right Ear: External ear normal.      Left Ear: External ear normal.      Nose: No congestion or rhinorrhea.     Eyes:      General: No scleral icterus.        Right eye: No discharge.         Left eye: No discharge.      Conjunctiva/sclera: Conjunctivae normal.       Cardiovascular:      Rate and Rhythm: Normal rate.   Pulmonary:      Effort: Pulmonary effort is normal. No respiratory distress.     Neurological:      General: No focal deficit present.      Mental Status: She is alert and oriented to person, place, and time.     Psychiatric:         Mood and Affect: Mood normal.         Behavior: Behavior normal.         Thought Content: Thought content normal.         "

## 2025-07-17 NOTE — ASSESSMENT & PLAN NOTE
Wt Readings from Last 3 Encounters:   07/17/25 123 kg (271 lb 3.2 oz)   06/17/25 122 kg (268 lb)   06/04/25 120 kg (265 lb)     Initial weight (04/15/25): 260 lbs, Body mass index is 40.15 kg/m².   TWL: - lbs    History:   Previously BMI in the 50s in 2016.    Currently working on lifestyle improvement only.   Assessment: Improving weight/BMI.   Med management:None. Work on lifestyle improvement and complete labs as discussed.   Will consider metformin at next appointment.  Recommended focusing on building good habits over time by setting and being consistent with realistic goals.   Improve nutritional intake (recommended Mediterranean diet, low-carb diet)  Calorie control (creating a modest calorie deficit of 500-1000 -calorie/day)  Monitoring portion size and frequency of intake, limiting snacking as discussed  Aiming for Whole Foods and healthy fats  Limiting added sugars and processed foods as discussed  Exercising and move body throughout the day and regularly as discussed  Prioritize sleep and mental health  Return in 6 months for routine visit.        Orders:  •  Comprehensive metabolic panel; Future  •  CBC and differential; Future